# Patient Record
Sex: MALE | Race: WHITE | Employment: FULL TIME | URBAN - NONMETROPOLITAN AREA
[De-identification: names, ages, dates, MRNs, and addresses within clinical notes are randomized per-mention and may not be internally consistent; named-entity substitution may affect disease eponyms.]

---

## 2020-05-27 ENCOUNTER — HOSPITAL ENCOUNTER (EMERGENCY)
Age: 20
Discharge: HOME | End: 2020-05-27

## 2020-05-27 ENCOUNTER — APPOINTMENT (OUTPATIENT)
Dept: GENERAL RADIOLOGY | Age: 20
End: 2020-05-27

## 2020-05-27 ENCOUNTER — APPOINTMENT (OUTPATIENT)
Dept: CT IMAGING | Age: 20
End: 2020-05-27

## 2020-05-27 VITALS
BODY MASS INDEX: 29.22 KG/M2 | DIASTOLIC BLOOD PRESSURE: 94 MMHG | OXYGEN SATURATION: 97 % | RESPIRATION RATE: 16 BRPM | SYSTOLIC BLOOD PRESSURE: 156 MMHG | HEART RATE: 102 BPM | WEIGHT: 240 LBS | HEIGHT: 76 IN | TEMPERATURE: 99.1 F

## 2020-05-27 PROCEDURE — 72100 X-RAY EXAM L-S SPINE 2/3 VWS: CPT

## 2020-05-27 PROCEDURE — 72072 X-RAY EXAM THORAC SPINE 3VWS: CPT

## 2020-05-27 PROCEDURE — 70450 CT HEAD/BRAIN W/O DYE: CPT

## 2020-05-27 PROCEDURE — 6370000000 HC RX 637 (ALT 250 FOR IP): Performed by: NURSE PRACTITIONER

## 2020-05-27 PROCEDURE — 72125 CT NECK SPINE W/O DYE: CPT

## 2020-05-27 PROCEDURE — 99283 EMERGENCY DEPT VISIT LOW MDM: CPT

## 2020-05-27 RX ORDER — IBUPROFEN 800 MG/1
800 TABLET ORAL ONCE
Status: COMPLETED | OUTPATIENT
Start: 2020-05-27 | End: 2020-05-27

## 2020-05-27 RX ORDER — IBUPROFEN 600 MG/1
600 TABLET ORAL EVERY 6 HOURS PRN
Qty: 30 TABLET | Refills: 0 | Status: SHIPPED | OUTPATIENT
Start: 2020-05-27 | End: 2021-10-18 | Stop reason: ALTCHOICE

## 2020-05-27 RX ORDER — CYCLOBENZAPRINE HCL 10 MG
10 TABLET ORAL 3 TIMES DAILY PRN
Qty: 12 TABLET | Refills: 0 | Status: SHIPPED | OUTPATIENT
Start: 2020-05-27 | End: 2020-06-06

## 2020-05-27 RX ADMIN — IBUPROFEN 800 MG: 800 TABLET, FILM COATED ORAL at 19:40

## 2020-05-27 ASSESSMENT — PAIN DESCRIPTION - PAIN TYPE: TYPE: ACUTE PAIN

## 2020-05-27 ASSESSMENT — PAIN SCALES - GENERAL
PAINLEVEL_OUTOF10: 4
PAINLEVEL_OUTOF10: 6

## 2020-05-27 ASSESSMENT — PAIN DESCRIPTION - LOCATION: LOCATION: HEAD

## 2020-05-27 ASSESSMENT — PAIN DESCRIPTION - FREQUENCY: FREQUENCY: CONTINUOUS

## 2020-05-27 ASSESSMENT — PAIN DESCRIPTION - ONSET: ONSET: SUDDEN

## 2020-05-27 ASSESSMENT — PAIN DESCRIPTION - DESCRIPTORS: DESCRIPTORS: ACHING

## 2020-05-28 ASSESSMENT — ENCOUNTER SYMPTOMS
CHEST TIGHTNESS: 0
BACK PAIN: 1
ABDOMINAL PAIN: 0
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
## 2020-09-07 ENCOUNTER — APPOINTMENT (OUTPATIENT)
Dept: CT IMAGING | Age: 20
DRG: 156 | End: 2020-09-07

## 2020-09-07 ENCOUNTER — HOSPITAL ENCOUNTER (INPATIENT)
Age: 20
LOS: 4 days | Discharge: HOME | DRG: 156 | End: 2020-09-14
Attending: INTERNAL MEDICINE | Admitting: INTERNAL MEDICINE

## 2020-09-07 PROBLEM — K11.3 ABSCESS OF PAROTID GLAND: Status: ACTIVE | Noted: 2020-09-07

## 2020-09-07 LAB
ALBUMIN SERPL-MCNC: 4.7 G/DL (ref 3.5–5.1)
ALP BLD-CCNC: 134 U/L (ref 38–126)
ALT SERPL-CCNC: 43 U/L (ref 11–66)
ANION GAP SERPL CALCULATED.3IONS-SCNC: 12 MEQ/L (ref 8–16)
AST SERPL-CCNC: 34 U/L (ref 5–40)
BASOPHILS # BLD: 0.2 %
BASOPHILS ABSOLUTE: 0 THOU/MM3 (ref 0–0.1)
BILIRUB SERPL-MCNC: 0.5 MG/DL (ref 0.3–1.2)
BUN BLDV-MCNC: 7 MG/DL (ref 7–22)
CALCIUM SERPL-MCNC: 9.5 MG/DL (ref 8.5–10.5)
CHLORIDE BLD-SCNC: 103 MEQ/L (ref 98–111)
CO2: 23 MEQ/L (ref 23–33)
CREAT SERPL-MCNC: 0.7 MG/DL (ref 0.4–1.2)
EOSINOPHIL # BLD: 1.1 %
EOSINOPHILS ABSOLUTE: 0.1 THOU/MM3 (ref 0–0.4)
ERYTHROCYTE [DISTWIDTH] IN BLOOD BY AUTOMATED COUNT: 12.6 % (ref 11.5–14.5)
ERYTHROCYTE [DISTWIDTH] IN BLOOD BY AUTOMATED COUNT: 40.9 FL (ref 35–45)
GFR SERPL CREATININE-BSD FRML MDRD: > 90 ML/MIN/1.73M2
GLUCOSE BLD-MCNC: 99 MG/DL (ref 70–108)
HCT VFR BLD CALC: 48.6 % (ref 42–52)
HEMOGLOBIN: 16.5 GM/DL (ref 14–18)
IMMATURE GRANS (ABS): 0.04 THOU/MM3 (ref 0–0.07)
IMMATURE GRANULOCYTES: 0.3 %
LACTIC ACID: 1.1 MMOL/L (ref 0.5–2.2)
LYMPHOCYTES # BLD: 12.2 %
LYMPHOCYTES ABSOLUTE: 1.5 THOU/MM3 (ref 1–4.8)
MCH RBC QN AUTO: 29.9 PG (ref 26–33)
MCHC RBC AUTO-ENTMCNC: 34 GM/DL (ref 32.2–35.5)
MCV RBC AUTO: 88.2 FL (ref 80–94)
MONOCYTES # BLD: 8.5 %
MONOCYTES ABSOLUTE: 1 THOU/MM3 (ref 0.4–1.3)
NUCLEATED RED BLOOD CELLS: 0 /100 WBC
OSMOLALITY CALCULATION: 273.7 MOSMOL/KG (ref 275–300)
PLATELET # BLD: 257 THOU/MM3 (ref 130–400)
PMV BLD AUTO: 9 FL (ref 9.4–12.4)
POTASSIUM REFLEX MAGNESIUM: 4.2 MEQ/L (ref 3.5–5.2)
PROCALCITONIN: 0.04 NG/ML (ref 0.01–0.09)
RBC # BLD: 5.51 MILL/MM3 (ref 4.7–6.1)
SEG NEUTROPHILS: 77.7 %
SEGMENTED NEUTROPHILS ABSOLUTE COUNT: 9.2 THOU/MM3 (ref 1.8–7.7)
SODIUM BLD-SCNC: 138 MEQ/L (ref 135–145)
TOTAL PROTEIN: 7.8 G/DL (ref 6.1–8)
WBC # BLD: 11.9 THOU/MM3 (ref 4.8–10.8)

## 2020-09-07 PROCEDURE — 84145 PROCALCITONIN (PCT): CPT

## 2020-09-07 PROCEDURE — 80053 COMPREHEN METABOLIC PANEL: CPT

## 2020-09-07 PROCEDURE — 87040 BLOOD CULTURE FOR BACTERIA: CPT

## 2020-09-07 PROCEDURE — 6360000002 HC RX W HCPCS: Performed by: PHYSICIAN ASSISTANT

## 2020-09-07 PROCEDURE — 99282 EMERGENCY DEPT VISIT SF MDM: CPT

## 2020-09-07 PROCEDURE — 6360000004 HC RX CONTRAST MEDICATION: Performed by: PHYSICIAN ASSISTANT

## 2020-09-07 PROCEDURE — 96375 TX/PRO/DX INJ NEW DRUG ADDON: CPT

## 2020-09-07 PROCEDURE — 99220 PR INITIAL OBSERVATION CARE/DAY 70 MINUTES: CPT | Performed by: PHYSICIAN ASSISTANT

## 2020-09-07 PROCEDURE — 2580000003 HC RX 258: Performed by: PHYSICIAN ASSISTANT

## 2020-09-07 PROCEDURE — 83605 ASSAY OF LACTIC ACID: CPT

## 2020-09-07 PROCEDURE — 36415 COLL VENOUS BLD VENIPUNCTURE: CPT

## 2020-09-07 PROCEDURE — 94761 N-INVAS EAR/PLS OXIMETRY MLT: CPT

## 2020-09-07 PROCEDURE — 96367 TX/PROPH/DG ADDL SEQ IV INF: CPT

## 2020-09-07 PROCEDURE — 70491 CT SOFT TISSUE NECK W/DYE: CPT

## 2020-09-07 PROCEDURE — G0378 HOSPITAL OBSERVATION PER HR: HCPCS

## 2020-09-07 PROCEDURE — 99285 EMERGENCY DEPT VISIT HI MDM: CPT

## 2020-09-07 PROCEDURE — 85025 COMPLETE CBC W/AUTO DIFF WBC: CPT

## 2020-09-07 PROCEDURE — 6370000000 HC RX 637 (ALT 250 FOR IP): Performed by: PHYSICIAN ASSISTANT

## 2020-09-07 PROCEDURE — 99281 EMR DPT VST MAYX REQ PHY/QHP: CPT

## 2020-09-07 PROCEDURE — 2500000003 HC RX 250 WO HCPCS: Performed by: PHYSICIAN ASSISTANT

## 2020-09-07 PROCEDURE — 96365 THER/PROPH/DIAG IV INF INIT: CPT

## 2020-09-07 RX ORDER — SODIUM CHLORIDE 0.9 % (FLUSH) 0.9 %
10 SYRINGE (ML) INJECTION EVERY 12 HOURS SCHEDULED
Status: DISCONTINUED | OUTPATIENT
Start: 2020-09-07 | End: 2020-09-14 | Stop reason: HOSPADM

## 2020-09-07 RX ORDER — KETOROLAC TROMETHAMINE 30 MG/ML
30 INJECTION, SOLUTION INTRAMUSCULAR; INTRAVENOUS ONCE
Status: COMPLETED | OUTPATIENT
Start: 2020-09-07 | End: 2020-09-07

## 2020-09-07 RX ORDER — MORPHINE SULFATE 4 MG/ML
4 INJECTION, SOLUTION INTRAMUSCULAR; INTRAVENOUS ONCE
Status: COMPLETED | OUTPATIENT
Start: 2020-09-07 | End: 2020-09-07

## 2020-09-07 RX ORDER — SODIUM CHLORIDE 0.9 % (FLUSH) 0.9 %
10 SYRINGE (ML) INJECTION PRN
Status: DISCONTINUED | OUTPATIENT
Start: 2020-09-07 | End: 2020-09-14 | Stop reason: HOSPADM

## 2020-09-07 RX ORDER — ONDANSETRON 2 MG/ML
4 INJECTION INTRAMUSCULAR; INTRAVENOUS ONCE
Status: COMPLETED | OUTPATIENT
Start: 2020-09-07 | End: 2020-09-07

## 2020-09-07 RX ORDER — 0.9 % SODIUM CHLORIDE 0.9 %
500 INTRAVENOUS SOLUTION INTRAVENOUS ONCE
Status: COMPLETED | OUTPATIENT
Start: 2020-09-07 | End: 2020-09-07

## 2020-09-07 RX ORDER — ACETAMINOPHEN 325 MG/1
650 TABLET ORAL EVERY 6 HOURS PRN
Status: DISCONTINUED | OUTPATIENT
Start: 2020-09-07 | End: 2020-09-14 | Stop reason: HOSPADM

## 2020-09-07 RX ORDER — POLYETHYLENE GLYCOL 3350 17 G/17G
17 POWDER, FOR SOLUTION ORAL DAILY PRN
Status: DISCONTINUED | OUTPATIENT
Start: 2020-09-07 | End: 2020-09-14 | Stop reason: HOSPADM

## 2020-09-07 RX ORDER — CLINDAMYCIN PHOSPHATE 900 MG/50ML
900 INJECTION INTRAVENOUS ONCE
Status: COMPLETED | OUTPATIENT
Start: 2020-09-07 | End: 2020-09-07

## 2020-09-07 RX ORDER — ACETAMINOPHEN 500 MG
500 TABLET ORAL EVERY 6 HOURS PRN
COMMUNITY
End: 2021-10-18 | Stop reason: ALTCHOICE

## 2020-09-07 RX ORDER — HYDROCODONE BITARTRATE AND ACETAMINOPHEN 5; 325 MG/1; MG/1
1 TABLET ORAL EVERY 4 HOURS PRN
Status: DISCONTINUED | OUTPATIENT
Start: 2020-09-07 | End: 2020-09-14 | Stop reason: HOSPADM

## 2020-09-07 RX ORDER — PROMETHAZINE HYDROCHLORIDE 25 MG/1
12.5 TABLET ORAL EVERY 6 HOURS PRN
Status: DISCONTINUED | OUTPATIENT
Start: 2020-09-07 | End: 2020-09-14 | Stop reason: HOSPADM

## 2020-09-07 RX ORDER — ACETAMINOPHEN 650 MG/1
650 SUPPOSITORY RECTAL EVERY 6 HOURS PRN
Status: DISCONTINUED | OUTPATIENT
Start: 2020-09-07 | End: 2020-09-14 | Stop reason: HOSPADM

## 2020-09-07 RX ORDER — ONDANSETRON 2 MG/ML
4 INJECTION INTRAMUSCULAR; INTRAVENOUS EVERY 6 HOURS PRN
Status: DISCONTINUED | OUTPATIENT
Start: 2020-09-07 | End: 2020-09-14 | Stop reason: HOSPADM

## 2020-09-07 RX ORDER — SODIUM CHLORIDE 9 MG/ML
INJECTION, SOLUTION INTRAVENOUS CONTINUOUS
Status: DISCONTINUED | OUTPATIENT
Start: 2020-09-07 | End: 2020-09-14 | Stop reason: HOSPADM

## 2020-09-07 RX ORDER — METHYLPREDNISOLONE SODIUM SUCCINATE 125 MG/2ML
125 INJECTION, POWDER, LYOPHILIZED, FOR SOLUTION INTRAMUSCULAR; INTRAVENOUS ONCE
Status: COMPLETED | OUTPATIENT
Start: 2020-09-07 | End: 2020-09-07

## 2020-09-07 RX ORDER — HYDROCODONE BITARTRATE AND ACETAMINOPHEN 5; 325 MG/1; MG/1
2 TABLET ORAL EVERY 4 HOURS PRN
Status: DISCONTINUED | OUTPATIENT
Start: 2020-09-07 | End: 2020-09-14 | Stop reason: HOSPADM

## 2020-09-07 RX ADMIN — KETOROLAC TROMETHAMINE 30 MG: 30 INJECTION, SOLUTION INTRAMUSCULAR at 20:12

## 2020-09-07 RX ADMIN — CLINDAMYCIN IN 5 PERCENT DEXTROSE 900 MG: 18 INJECTION, SOLUTION INTRAVENOUS at 20:24

## 2020-09-07 RX ADMIN — SODIUM CHLORIDE: 9 INJECTION, SOLUTION INTRAVENOUS at 23:06

## 2020-09-07 RX ADMIN — SODIUM CHLORIDE 500 ML: 9 INJECTION, SOLUTION INTRAVENOUS at 20:12

## 2020-09-07 RX ADMIN — HYDROCODONE BITARTRATE AND ACETAMINOPHEN 2 TABLET: 5; 325 TABLET ORAL at 23:00

## 2020-09-07 RX ADMIN — METHYLPREDNISOLONE SODIUM SUCCINATE 125 MG: 125 INJECTION, POWDER, FOR SOLUTION INTRAMUSCULAR; INTRAVENOUS at 21:40

## 2020-09-07 RX ADMIN — IOPAMIDOL 80 ML: 755 INJECTION, SOLUTION INTRAVENOUS at 20:44

## 2020-09-07 RX ADMIN — MORPHINE SULFATE 4 MG: 4 INJECTION, SOLUTION INTRAMUSCULAR; INTRAVENOUS at 21:40

## 2020-09-07 RX ADMIN — AMPICILLIN SODIUM AND SULBACTAM SODIUM 1.5 G: 1; .5 INJECTION, POWDER, FOR SOLUTION INTRAMUSCULAR; INTRAVENOUS at 22:02

## 2020-09-07 RX ADMIN — SODIUM CHLORIDE: 9 INJECTION, SOLUTION INTRAVENOUS at 22:24

## 2020-09-07 RX ADMIN — ONDANSETRON 4 MG: 2 INJECTION INTRAMUSCULAR; INTRAVENOUS at 21:40

## 2020-09-07 ASSESSMENT — ENCOUNTER SYMPTOMS
FACIAL SWELLING: 1
TROUBLE SWALLOWING: 0
VOMITING: 0
SORE THROAT: 0
COLOR CHANGE: 0
SHORTNESS OF BREATH: 0

## 2020-09-07 ASSESSMENT — PAIN SCALES - GENERAL
PAINLEVEL_OUTOF10: 7
PAINLEVEL_OUTOF10: 9
PAINLEVEL_OUTOF10: 9
PAINLEVEL_OUTOF10: 7

## 2020-09-07 ASSESSMENT — PAIN DESCRIPTION - FREQUENCY
FREQUENCY: CONTINUOUS
FREQUENCY: CONTINUOUS

## 2020-09-07 ASSESSMENT — PAIN DESCRIPTION - LOCATION
LOCATION: TEETH
LOCATION: TEETH

## 2020-09-07 ASSESSMENT — PAIN DESCRIPTION - ORIENTATION
ORIENTATION: RIGHT;LEFT
ORIENTATION: RIGHT;LEFT

## 2020-09-07 ASSESSMENT — PAIN DESCRIPTION - DESCRIPTORS
DESCRIPTORS: ACHING
DESCRIPTORS: ACHING

## 2020-09-07 ASSESSMENT — PAIN DESCRIPTION - PAIN TYPE
TYPE: ACUTE PAIN
TYPE: ACUTE PAIN

## 2020-09-07 NOTE — ED TRIAGE NOTES
Pt comes to the ED with dental pain for the past week. Pt states he needs his wisdom teeth taken out but he cannot get in until December.   Pt states he saw a dentist last Thursday and they gave him a \"rinse\"

## 2020-09-08 LAB
ANION GAP SERPL CALCULATED.3IONS-SCNC: 11 MEQ/L (ref 8–16)
BUN BLDV-MCNC: 7 MG/DL (ref 7–22)
CALCIUM SERPL-MCNC: 9.9 MG/DL (ref 8.5–10.5)
CHLORIDE BLD-SCNC: 102 MEQ/L (ref 98–111)
CO2: 23 MEQ/L (ref 23–33)
CREAT SERPL-MCNC: 0.5 MG/DL (ref 0.4–1.2)
ERYTHROCYTE [DISTWIDTH] IN BLOOD BY AUTOMATED COUNT: 12.7 % (ref 11.5–14.5)
ERYTHROCYTE [DISTWIDTH] IN BLOOD BY AUTOMATED COUNT: 40.8 FL (ref 35–45)
GFR SERPL CREATININE-BSD FRML MDRD: > 90 ML/MIN/1.73M2
GLUCOSE BLD-MCNC: 133 MG/DL (ref 70–108)
HCT VFR BLD CALC: 50.6 % (ref 42–52)
HEMOGLOBIN: 17.1 GM/DL (ref 14–18)
MCH RBC QN AUTO: 29.9 PG (ref 26–33)
MCHC RBC AUTO-ENTMCNC: 33.8 GM/DL (ref 32.2–35.5)
MCV RBC AUTO: 88.5 FL (ref 80–94)
OSMOLALITY CALCULATION: 271.8 MOSMOL/KG (ref 275–300)
PLATELET # BLD: 267 THOU/MM3 (ref 130–400)
PMV BLD AUTO: 9.2 FL (ref 9.4–12.4)
POTASSIUM REFLEX MAGNESIUM: 4.7 MEQ/L (ref 3.5–5.2)
RBC # BLD: 5.72 MILL/MM3 (ref 4.7–6.1)
SODIUM BLD-SCNC: 136 MEQ/L (ref 135–145)
WBC # BLD: 8.9 THOU/MM3 (ref 4.8–10.8)

## 2020-09-08 PROCEDURE — 6360000002 HC RX W HCPCS: Performed by: PHYSICIAN ASSISTANT

## 2020-09-08 PROCEDURE — G0378 HOSPITAL OBSERVATION PER HR: HCPCS

## 2020-09-08 PROCEDURE — 36415 COLL VENOUS BLD VENIPUNCTURE: CPT

## 2020-09-08 PROCEDURE — 99224 PR SBSQ OBSERVATION CARE/DAY 15 MINUTES: CPT | Performed by: NURSE PRACTITIONER

## 2020-09-08 PROCEDURE — 96366 THER/PROPH/DIAG IV INF ADDON: CPT

## 2020-09-08 PROCEDURE — 99225 PR SBSQ OBSERVATION CARE/DAY 25 MINUTES: CPT | Performed by: PHYSICIAN ASSISTANT

## 2020-09-08 PROCEDURE — 96367 TX/PROPH/DG ADDL SEQ IV INF: CPT

## 2020-09-08 PROCEDURE — 6360000002 HC RX W HCPCS: Performed by: NURSE PRACTITIONER

## 2020-09-08 PROCEDURE — 6370000000 HC RX 637 (ALT 250 FOR IP): Performed by: PHYSICIAN ASSISTANT

## 2020-09-08 PROCEDURE — 2580000003 HC RX 258: Performed by: PHYSICIAN ASSISTANT

## 2020-09-08 PROCEDURE — 94760 N-INVAS EAR/PLS OXIMETRY 1: CPT

## 2020-09-08 PROCEDURE — 2580000003 HC RX 258: Performed by: NURSE PRACTITIONER

## 2020-09-08 PROCEDURE — 80048 BASIC METABOLIC PNL TOTAL CA: CPT

## 2020-09-08 PROCEDURE — 85027 COMPLETE CBC AUTOMATED: CPT

## 2020-09-08 RX ORDER — LEVOFLOXACIN 5 MG/ML
500 INJECTION, SOLUTION INTRAVENOUS EVERY 24 HOURS
Status: DISCONTINUED | OUTPATIENT
Start: 2020-09-08 | End: 2020-09-08 | Stop reason: ALTCHOICE

## 2020-09-08 RX ADMIN — HYDROCODONE BITARTRATE AND ACETAMINOPHEN 2 TABLET: 5; 325 TABLET ORAL at 09:12

## 2020-09-08 RX ADMIN — PIPERACILLIN AND TAZOBACTAM 3.38 G: 3; .375 INJECTION, POWDER, LYOPHILIZED, FOR SOLUTION INTRAVENOUS at 22:38

## 2020-09-08 RX ADMIN — SODIUM CHLORIDE: 9 INJECTION, SOLUTION INTRAVENOUS at 10:00

## 2020-09-08 RX ADMIN — AMPICILLIN SODIUM AND SULBACTAM SODIUM 1.5 G: 1; .5 INJECTION, POWDER, FOR SOLUTION INTRAMUSCULAR; INTRAVENOUS at 05:11

## 2020-09-08 RX ADMIN — ACETAMINOPHEN 650 MG: 325 TABLET ORAL at 16:50

## 2020-09-08 RX ADMIN — SODIUM CHLORIDE 3 G: 900 INJECTION INTRAVENOUS at 16:53

## 2020-09-08 RX ADMIN — SODIUM CHLORIDE: 9 INJECTION, SOLUTION INTRAVENOUS at 21:11

## 2020-09-08 RX ADMIN — HYDROCODONE BITARTRATE AND ACETAMINOPHEN 2 TABLET: 5; 325 TABLET ORAL at 18:33

## 2020-09-08 RX ADMIN — HYDROCODONE BITARTRATE AND ACETAMINOPHEN 2 TABLET: 5; 325 TABLET ORAL at 22:38

## 2020-09-08 RX ADMIN — AMPICILLIN SODIUM AND SULBACTAM SODIUM 1.5 G: 1; .5 INJECTION, POWDER, FOR SOLUTION INTRAMUSCULAR; INTRAVENOUS at 09:54

## 2020-09-08 RX ADMIN — HYDROCODONE BITARTRATE AND ACETAMINOPHEN 1 TABLET: 5; 325 TABLET ORAL at 14:10

## 2020-09-08 ASSESSMENT — PAIN SCALES - GENERAL
PAINLEVEL_OUTOF10: 5
PAINLEVEL_OUTOF10: 4
PAINLEVEL_OUTOF10: 5
PAINLEVEL_OUTOF10: 4
PAINLEVEL_OUTOF10: 8
PAINLEVEL_OUTOF10: 8
PAINLEVEL_OUTOF10: 5
PAINLEVEL_OUTOF10: 6
PAINLEVEL_OUTOF10: 3
PAINLEVEL_OUTOF10: 7
PAINLEVEL_OUTOF10: 5
PAINLEVEL_OUTOF10: 6

## 2020-09-08 ASSESSMENT — PAIN DESCRIPTION - FREQUENCY: FREQUENCY: CONTINUOUS

## 2020-09-08 ASSESSMENT — PAIN DESCRIPTION - PAIN TYPE: TYPE: ACUTE PAIN

## 2020-09-08 ASSESSMENT — PAIN DESCRIPTION - PROGRESSION
CLINICAL_PROGRESSION: NOT CHANGED

## 2020-09-08 ASSESSMENT — PAIN DESCRIPTION - ONSET: ONSET: ON-GOING

## 2020-09-08 ASSESSMENT — PAIN - FUNCTIONAL ASSESSMENT: PAIN_FUNCTIONAL_ASSESSMENT: ACTIVITIES ARE NOT PREVENTED

## 2020-09-08 ASSESSMENT — PAIN DESCRIPTION - ORIENTATION: ORIENTATION: RIGHT

## 2020-09-08 ASSESSMENT — PAIN DESCRIPTION - DESCRIPTORS: DESCRIPTORS: ACHING;DISCOMFORT

## 2020-09-08 NOTE — CONSULTS
Department of Otolaryngology  Consult Note    Reason for Consult:  Right parotitis  Requesting Physician:  Perla Cooper PA-C    CHIEF COMPLAINT:  Right jaw swelling and pain    History Obtained From:  patient, electronic medical record    HISTORY OF PRESENT ILLNESS:                The patient is a 23 y.o. male who was admitted to [facility] last evening for right jaw swelling and pain. Patient reports onset of symptoms about 8 days ago. Initially he ignored the symptoms, then was concerned about a tooth problem a few days later - seen with his dentist and told he needed wisdom teeth removed. He continued to have increased right jaw pain and swelling that became progressively worse. He was unable to eat or drink much of anything, noting that even Jello was difficult for him to manage. He is barely able to open his mouth, which has become progressively worse as well. He denies any fevers. He has never had an issue like this before. No history of kidney stones. No excess calcium intake. He is not using sialogogues, aside from some pickle juice earlier this am.  Using a warm washcloth intermittently. WBC on admission 11.9. Afebrile. Given IV Clindamycin 900mg x1 dose and solumedrol 125mg x1 dose in ER. Receiving Unasyn 1.5gm Q6h. Past Medical History:    History reviewed. No pertinent past medical history. Past Surgical History:    History reviewed. No pertinent surgical history.     Current Medications:   Current Facility-Administered Medications: piperacillin-tazobactam (ZOSYN) 3.375 g in dextrose 5 % 50 mL IVPB extended infusion (mini-bag), 3.375 g, Intravenous, Q8H  sodium chloride flush 0.9 % injection 10 mL, 10 mL, Intravenous, 2 times per day  sodium chloride flush 0.9 % injection 10 mL, 10 mL, Intravenous, PRN  acetaminophen (TYLENOL) tablet 650 mg, 650 mg, Oral, Q6H PRN **OR** acetaminophen (TYLENOL) suppository 650 mg, 650 mg, Rectal, Q6H PRN  polyethylene glycol (GLYCOLAX) packet 17 g, 17 g, Oral, Daily PRN  promethazine (PHENERGAN) tablet 12.5 mg, 12.5 mg, Oral, Q6H PRN **OR** ondansetron (ZOFRAN) injection 4 mg, 4 mg, Intravenous, Q6H PRN  enoxaparin (LOVENOX) injection 40 mg, 40 mg, Subcutaneous, Daily  0.9 % sodium chloride infusion, , Intravenous, Continuous  HYDROcodone-acetaminophen (NORCO) 5-325 MG per tablet 1 tablet, 1 tablet, Oral, Q4H PRN **OR** HYDROcodone-acetaminophen (NORCO) 5-325 MG per tablet 2 tablet, 2 tablet, Oral, Q4H PRN    Allergies:  Review of patient's allergies indicates no known allergies. Social History:    TOBACCO:   reports that he has never smoked. He has never used smokeless tobacco.  ETOH:   reports current alcohol use of about 5.0 - 15.0 standard drinks of alcohol per week. DRUGS:   reports no history of drug use. Family History:   History reviewed. No pertinent family history. REVIEW OF SYSTEMS:    A complete multi-organ review of systems was performed using a new patient questionnaire, and reviewed by me.   ENT:  negative except as noted in HPI  CONSTITUTIONAL:  negative except as noted in HPI  EYES:  negative except as noted in HPI  RESPIRATORY:  negative except as noted in HPI  CARDIOVASCULAR:  negative except as noted in HPI  GASTROINTESTINAL:  negative except as noted in HPI  GENITOURINARY:  negative except as noted in HPI  MUSCULOSKELETAL:  negative except as noted in HPI  SKIN:  negative except as noted in HPI  ENDOCRINE/METABOLIC: negative except as noted in HPI  HEMATOLOGIC/LYMPHATIC:  negative except as noted in HPI  ALLERGY/IMMUN: negative except as noted in HPI  NEUROLOGICAL:  negative except as noted in HPI  BEHAVIOR/PSYCH:  negative except as noted in HPI    PHYSICAL EXAM:    VITALS:  BP (!) 144/84   Pulse 84   Temp 97.5 °F (36.4 °C) (Oral)   Resp 16   Ht 6' 5\" (1.956 m)   Wt 271 lb (122.9 kg)   SpO2 100%   BMI 32.14 kg/m²   CONSTITUTIONAL:  awake, alert, cooperative, no apparent distress, and appears stated age  ENT:  Normocephalic, without obvious abnormality, atraumatic, external ears without lesions, oral pharynx with moist mucus membranes, tonsils unremarkable, gums normal and good dentition. NECK:  Left supple without palpable adenopathy or other palpable abnormalities. Right parotid gland is very firm, immobile, very tender. Oral mucous membranes moist.  Unable to express any material from right parotid duct- mild swelling at duct opening. + moderate trismus. LUNGS:  no increased work of breathing  CARDIOVASCULAR:  regular rate and rhythm  NEUROLOGIC:  Awake, alert, oriented to name, place and time. Cranial nerves II-XII are grossly intact. DATA:    CBC with Differential:    Lab Results   Component Value Date    WBC 8.9 09/08/2020    RBC 5.72 09/08/2020    HGB 17.1 09/08/2020    HCT 50.6 09/08/2020     09/08/2020    MCV 88.5 09/08/2020    MCH 29.9 09/08/2020    MCHC 33.8 09/08/2020    NRBC 0 09/07/2020    SEGSPCT 77.7 09/07/2020    MONOPCT 8.5 09/07/2020    MONOSABS 1.0 09/07/2020    LYMPHSABS 1.5 09/07/2020    EOSABS 0.1 09/07/2020    BASOSABS 0.0 09/07/2020     Radiology Review:  9/7/2020 2052 CT Neck W Contrast  Impression    1. The right parotid gland is enlarged measuring 5.1 x 2.0 x 4.3 cm. There is mixed attenuation throughout the enlarged right parotid gland suggesting a combination of abscess formation and phlegmon. There are stranding densities consistent with    inflammation within the adjacent subcutaneous tissues. There are numerous cervical lymph nodes which most likely are reactive. There is no associated osseous or vascular abnormality. Appropriate clinical management is recommended.         **This report has been created using voice recognition software.  It may contain minor errors which are inherent in voice recognition technology. **         Final report electronically signed by Dr. Yadira Ortega on 9/7/2020 9:06 PM        IMPRESSION/RECOMMENDATIONS:      Acute parotitis, right  - WBC improved, 8.9 today. Afebrile.  - CT reviewed with Dr Truman Rodriguez - no defined abscess, appears as inspissated saliva within the gland  - Increase Unasyn to 3gm dose. Dr Truman Rodriguez recommends adding Levaquin. - If no significant improvement in the am, consider repeat CT imaging. Imperative to resolution of parotitis (these are not optional/prn):            - Oral hydration and hygiene. - Apply moist heat for 15 minutes followed by gentle massage (over parotid gland and towards front of mouth, this is tomilk the gland. The duct from the gland empties in the mouth across from the top molars). Do this every 4 hours while awake. - Use sialogogues (increases saliva production)- lemon wedges, sour candies (warheads, lemon drops, etc) every 3-4 hours while awake.     Electronically signed by HERI Tran CNP on 9/8/2020 at 4:13 PM

## 2020-09-08 NOTE — H&P
Hospitalist - History & Physical      Patient: Laz Gonzales    Unit/Bed:21/021A  YOB: 2000  MRN: [id number]   Acct: [de-identified]   PCP: No primary care provider on file. Date of Service: Pt seen/examined on 09/07/20  and Admitted to Observation with expected LOS less than two midnights due to medical therapy. Chief Complaint:  Right Dental Pain    Assessment and Plan:-  1. Right Parotid Gland Abscess with phlegmon: 5.1x2.0x4.3cm right parotid gland abscess and phlegmon, cervical lymphadenopathy. WBC 11.9. Unasyn, Solu-medrol x1, morphine for analgesia in ED. BC x2 pending. a. ENT consulted in ED who recommended monitoring overnight and if feeling better in AM to f/u with [facility] outpatient on 9/11/20.   b. Continue Unasyn, transition to PO abx on discharge. IVF due to poor PO intake x3 days with difficulty chewing. Warm compresses. Sialogogues not available in main pharmacy, recommend contacting dietary for sour food/drink in AM. Discussed sialogogues with patient. History Of Present Illness:    Laz Gonzales is a 24 y/o  male without significant past medical history who presented to [facility] on 9/7/20 c/o right jaw pain x1 week. He states that he visited his dentist five days ago and they recommended he have wisdom teeth removed in 12/2020. Pain persisted throughout the week and became unbearable with Ibuprofen/Tylenol regimen at home. He states that he has had decreased PO intake due to the pain associated with chewing. He denies any ear pain, sore throat, vision changes, sour taste in mouth, fever, or chills. No increased salivation. [place] [employer] He denies any chest pain, SOB, or productive cough. Workup in ED revealed a right parotid gland abscess with phlegmon. He was started on Unasyn in ED and treated with Solumedrol and morphine. ENT was consulted by ED who recommended admission and monitoring overnight.  If he feels better in AM goal would be to discharge with plan for outpatient ENT.. Patient admitted under hospitalist service for further evaluation and treatment. Past Medical History:    No past medical history on file. Past Surgical History:    No past surgical history on file. Home Medications:   No current facility-administered medications on file prior to encounter. Current Outpatient Medications on File Prior to Encounter   Medication Sig Dispense Refill    ibuprofen (ADVIL;MOTRIN) 600 MG tablet Take 1 tablet by mouth every 6 hours as needed for Pain 30 tablet 0       Allergies:    Patient has no known allergies. Social History:    reports that he has never smoked. He has never used smokeless tobacco. He reports previous alcohol use. He reports that he does not use drugs. Family History:   No family history on file. Diet:  No diet orders on file    Review of systems:   Pertinent positives as noted in the HPI. All other systems reviewed and negative. PHYSICAL EXAM:  BP (!) 152/93   Pulse 88   Temp 98 °F (36.7 °C) (Oral)   Resp 16   Ht 6' 5\" (1.956 m)   Wt 240 lb (108.9 kg)   SpO2 99%   BMI 28.46 kg/m²   General appearance: No apparent distress, appears stated age and cooperative. HEENT: Normal cephalic, atraumatic. Swelling, erythema, and TTP over the right parotid gland. Opens mouth approximately 2cm wide, no further secondary to pain. TTP right submandibular region and right anterior cervical chain with associated lymphadenopathy. Pupils equal, round, and reactive to light. Extra ocular muscles intact. Conjunctivae/corneas clear. Neck: Supple, with full range of motion. No jugular venous distention. Trachea midline. Respiratory:  Normal respiratory effort. Clear to auscultation, bilaterally without Rales/Wheezes/Rhonchi. Cardiovascular: Regular rate and rhythm with normal S1/S2 without murmurs, rubs or gallops. Abdomen: Soft, non-tender, non-distended with normal bowel sounds.   Musculoskeletal:  No clubbing, cyanosis or edema bilaterally. Skin: Skin color, texture, turgor normal.  No rashes or lesions. Neurologic:  Neurovascularly intact without any focal sensory/motor deficits. Cranial nerves: II-XII intact, grossly non-focal.  Psychiatric: Alert and oriented, thought content appropriate, normal insight  Capillary Refill: Brisk,< 3 seconds   Peripheral Pulses: +2 palpable, equal bilaterally     Labs:   Recent Labs     09/07/20 2005   WBC 11.9*   HGB 16.5   HCT 48.6        Recent Labs     09/07/20 2005      K 4.2      CO2 23   BUN 7   CREATININE 0.7   CALCIUM 9.5     Recent Labs     09/07/20 2005   AST 34   ALT 43   BILITOT 0.5   ALKPHOS 134*     No results for input(s): INR in the last 72 hours. No results for input(s): Burnadette Lundborg in the last 72 hours. Urinalysis:    No results found for: Mariposa Hoops, BACTERIA, RBCUA, BLOODU, SPECGRAV, [address]    Radiology:   CT SOFT TISSUE NECK W CONTRAST   Final Result   1. The right parotid gland is enlarged measuring 5.1 x 2.0 x 4.3 cm. There is mixed attenuation throughout the enlarged right parotid gland suggesting a combination of abscess formation and phlegmon. There are stranding densities consistent with    inflammation within the adjacent subcutaneous tissues. There are numerous cervical lymph nodes which most likely are reactive. There is no associated osseous or vascular abnormality. Appropriate clinical management is recommended. **This report has been created using voice recognition software. It may contain minor errors which are inherent in voice recognition technology. **      Final report electronically signed by Dr. Davis Serrano on 9/7/2020 9:06 PM        Ct Soft Tissue Neck W Contrast    Result Date: 9/7/2020  PROCEDURE: CT SOFT TISSUE NECK W CONTRAST CLINICAL INFORMATION: Right mandibular pain and swelling COMPARISON: No prior study.  TECHNIQUE: 3 mm axial imaging through the neck with intravenous contrast from the base of the brain to superior aspect of the heart. All CT scans at this facility use dose modulation, iterative reconstruction, and/or weight based dosing when appropriate to reduce the radiation dose to as low as reasonably achievable. CONTRAST: 80 mL Isovue-370 intravenously. FINDINGS: PHARYNX/ORAL CAVITY/TONSILS: Unremarkable. EPIGLOTTIS: Unremarkable. VALLECULA/PIRIFORM SINUSES: Unremarkable. LARYNX: Unremarkable. CERVICAL LYMPHADENOPATHY: 1. There are no pathologically enlarged lymph nodes. SALIVARY GLANDS/THYROID GLAND: 1. The right parotid gland is enlarged measuring 5.1 x 2.0 x 4.3 cm. There is mixed attenuation throughout the enlarged right parotid gland suggesting a combination of abscess formation and phlegmon. There are stranding densities consistent with inflammation within the adjacent subcutaneous tissues. There are numerous cervical lymph nodes which most likely are reactive. There is no associated osseous or vascular abnormality. 2. The left parotid gland is normal. 3. The thyroid gland is normal. INFLAMMATION: 1. As previously described. VASCULATURE: Unremarkable. LUNG APICES: Unremarkable. OSSEOUS STRUCTURES: Unremarkable. PARANASAL SINUSES: Unremarkable. BRAIN: Unremarkable. OTHER: None. 1. The right parotid gland is enlarged measuring 5.1 x 2.0 x 4.3 cm. There is mixed attenuation throughout the enlarged right parotid gland suggesting a combination of abscess formation and phlegmon. There are stranding densities consistent with inflammation within the adjacent subcutaneous tissues. There are numerous cervical lymph nodes which most likely are reactive. There is no associated osseous or vascular abnormality. Appropriate clinical management is recommended. **This report has been created using voice recognition software. It may contain minor errors which are inherent in voice recognition technology. ** Final report electronically signed by Dr. Ara Crisostomo on 9/7/2020 9:06 PM      Electronically signed by Franchesca Gutiérrez Romeo Tyler PA-C on 9/7/2020 at 9:41 PM

## 2020-09-08 NOTE — CARE COORDINATION
9/8/20, 10:43 AM EDT  DISCHARGE PLANNING EVALUATION:    Meghann Torres       Admitted from: ER, patient presented with right jaw pain. [facility] day: 0   Location: -11/011-A Reason for admit: Abscess of parotid gland [K11.3] Status: Obs. Admit order signed?: no  PMH:  has no past medical history on file. Procedure: N/A  Pertinent abnormal Imaging:CT of head and cervical spine: both negative for acute findings. CT of neck soft tissue:   1. The right parotid gland is enlarged measuring 5.1 x 2.0 x 4.3 cm. There is mixed attenuation throughout the enlarged right parotid gland suggesting a combination of abscess formation and phlegmon. There are stranding densities consistent with    inflammation within the adjacent subcutaneous tissues. There are numerous cervical lymph nodes which most likely are reactive. There is no associated osseous or vascular abnormality. Appropriate clinical management is recommended. Medications:  Scheduled Meds:   sodium chloride flush  10 mL Intravenous 2 times per day    enoxaparin  40 mg Subcutaneous Daily    ampicillin-sulbactam  1.5 g Intravenous Q6H     Continuous Infusions:   sodium chloride 100 mL/hr at 09/08/20 1000      Pertinent Info/Orders/Treatment Plan:  IV fluids, Unasyn, IV Cleocin and Solumedrol x 1 dose, prn Tylenol, Norco, Glycolax, Phenergan and Zofran, up as tolerated. Diet: DIET GENERAL;   Smoking status:  reports that he has never smoked. He has never used smokeless tobacco.   PCP: No primary care provider on file.   Readmission 30 days or less: No   %    Discharge Planning Evaluation  Current Residence:  Other (Comment)(college dorm)  Living Arrangements:  Friends   Support Systems:  Friends/Neighbors, Parent  Current Services PTA:     Potential Assistance Needed:  N/A  Potential Assistance Purchasing Medications:  No  Does patient want to participate in local refill/ meds to beds program?  Not Assessed  Type of Home Care Services: None  Patient expects to be discharged to:     Expected Discharge date: Follow Up Appointment: Best Day/ Time: Friday AM    Patient Goals/Plan/Treatment Preferences: Met with Mal Calabrese. He is a student at [employer]. Mal Calabrese drove himself to the hospital and will drive himself home at discharge. He is in agreement for a hospital follow-up appointment at the [employer] [facility] [employer] location. He is scheduled to follow-up with Burke Carney on 9/15/2020 at 09:00 a.m. Mal Calabrese denies a need for services or DME at discharge. Transportation/Food Security/Housekeeping Addressed:  No issues identified.     Evaluation: no

## 2020-09-08 NOTE — ED PROVIDER NOTES
[facility]  eMERGENCY dEPARTMENT eNCOUnter          CHIEF COMPLAINT       Chief Complaint   Patient presents with    Dental Pain       Nurses Notes reviewed and I agree except as noted inthe HPI. HISTORY OF PRESENT ILLNESS    Allan Wright is a 23 y.o. male who presents to the Emergency Department for the evaluation of dental pain. Patient reports that on August 31, he began having pain and swelling to his right jaw. He saw his dentist who did x-ray confirming presence of wisdom teeth and reports he was started on an unknown oral rinse 4 days ago but symptoms have gotten progressively worse. He reports he had no difficulty moving his jaw at that time, but now he cannot open his jaw fully. He reports he's been on a liquid diet. Reports cold fluids improve his pain temporarily. No worsening with chewing. No fevers, chills, nausea, vomiting, difficulty swallowing, shortness of breath. No history of similar symptoms. He's been taking 600 mg Ibuprofen and alternating with 500 mg Tylenol, but this provides only minimal improvement. The HPI was provided by the patient. REVIEW OF SYSTEMS     Review of Systems   Constitutional: Negative for chills and fever. HENT: Positive for dental problem and facial swelling. Negative for sore throat and trouble swallowing. Respiratory: Negative for shortness of breath. Gastrointestinal: Negative for vomiting. Musculoskeletal: Negative for neck pain and neck stiffness. Skin: Negative for color change. Neurological: Negative for headaches. PAST MEDICAL HISTORY    has no past medical history on file. SURGICAL HISTORY      has no past surgical history on file. CURRENT MEDICATIONS       Previous Medications    IBUPROFEN (ADVIL;MOTRIN) 600 MG TABLET    Take 1 tablet by mouth every 6 hours as needed for Pain       ALLERGIES     has No Known Allergies. FAMILY HISTORY     has no family status information on file.     family history is not on file. SOCIAL HISTORY      reports that he has never smoked. He has never used smokeless tobacco. He reports previous alcohol use. He reports that he does not use drugs. PHYSICAL EXAM     INITIAL VITALS:  height is 6' 5\" (1.956 m) and weight is 240 lb (108.9 kg). His oral temperature is 98 °F (36.7 °C). His blood pressure is 142/88 (abnormal) and his pulse is 86. His respiration is 18 and oxygen saturation is 99%. Physical Exam  Vitals signs and nursing note reviewed. Constitutional:       Appearance: Normal appearance. HENT:      Head: Normocephalic. Jaw: Trismus present. Mouth/Throat:      Mouth: Mucous membranes are moist.      Dentition: Normal dentition. No dental tenderness, gingival swelling, dental caries or dental abscesses. Pharynx: Oropharynx is clear. Eyes:      Conjunctiva/sclera: Conjunctivae normal.   Cardiovascular:      Rate and Rhythm: Normal rate. Pulmonary:      Effort: Pulmonary effort is normal. No respiratory distress. Breath sounds: No stridor. Skin:     General: Skin is warm and dry. Neurological:      General: No focal deficit present. Mental Status: He is alert and oriented to person, place, and time. Psychiatric:         Mood and Affect: Mood normal.         Behavior: Behavior normal.         DIFFERENTIAL DIAGNOSIS:   Differential diagnoses are discussed    DIAGNOSTIC RESULTS     EKG: All EKG's are interpreted by the Emergency Department Physician who either signs or Co-signsthis chart in the absence of a cardiologist.          RADIOLOGY: non-plain film images(s) such as CT, Ultrasound and MRI are read by the radiologist.    CT SOFT TISSUE NECK W CONTRAST   Final Result   1. The right parotid gland is enlarged measuring 5.1 x 2.0 x 4.3 cm. There is mixed attenuation throughout the enlarged right parotid gland suggesting a combination of abscess formation and phlegmon.  There are stranding densities consistent with    inflammation within the adjacent subcutaneous tissues. There are numerous cervical lymph nodes which most likely are reactive. There is no associated osseous or vascular abnormality. Appropriate clinical management is recommended. **This report has been created using voice recognition software. It may contain minor errors which are inherent in voice recognition technology. **      Final report electronically signed by Dr. Francisco Cárdenas on 9/7/2020 9:06 PM          LABS:      Labs Reviewed   CBC WITH AUTO DIFFERENTIAL - Abnormal; Notable for the following components:       Result Value    WBC 11.9 (*)     MPV 9.0 (*)     Segs Absolute 9.2 (*)     All other components within normal limits   COMPREHENSIVE METABOLIC PANEL W/ REFLEX TO MG FOR LOW K - Abnormal; Notable for the following components:    Alkaline Phosphatase 134 (*)     All other components within normal limits   OSMOLALITY - Abnormal; Notable for the following components:    Osmolality Calc 273.7 (*)     All other components within normal limits   CULTURE, BLOOD 1   CULTURE, BLOOD 2   LACTIC ACID, PLASMA   PROCALCITONIN   ANION GAP   GLOMERULAR FILTRATION RATE, ESTIMATED   BASIC METABOLIC PANEL W/ REFLEX TO MG FOR LOW K   CBC       EMERGENCY DEPARTMENT COURSE:   Vitals:    Vitals:    09/07/20 1904 09/07/20 2001 09/07/20 2146   BP: (!) 151/82 (!) 152/93 (!) 142/88   Pulse: 107 88 86   Resp: 12 16 18   Temp: 98 °F (36.7 °C)     TempSrc: Oral     SpO2: 98% 99% 99%   Weight: 240 lb (108.9 kg)     Height: 6' 5\" (1.956 m)        9:39 PM EDT: The patient was seen and evaluated. Patient presents for complaints of dental swelling. He states he has had pain and swelling since August 31, progressively worsening. He arrives here tachycardic, with white blood cell count 11,900. Lactic acid and pro calcitonin were within normal limits, blood cultures pending. He was initially started on clindamycin. CT obtained.   CT is consistent with parotid gland enlargement with associated abscess/phlegmon. Patient had no improvement in pain after Toradol. Discussed with attending provider. We will initiate Solu-Medrol, Unasyn, as well as morphine and Zofran for pain. Discussed with Dr. Elsy Britt, ENT, who is agreeable with the above plan as well as recommending lemon drops/juice and warm compresses. He did review the images. He is agreeable with plan of admission to the hospitalist service and will consult if the patient does not have significant improvement overnight and advises that the patient can follow with his [facility] on Friday if he has improved enough overnight for a.m. discharge. Discussed with the hospitalist service will admit for further care. CRITICAL CARE:   None    CONSULTS:  ENT  Hospitalist    PROCEDURES:  None    FINAL IMPRESSION      1. Abscess of parotid gland          DISPOSITION/PLAN   Admit    PATIENT REFERRED TO:  No follow-up provider specified.     DISCHARGEMEDICATIONS:  New Prescriptions    No medications on file       (Please note that portions of this note were completedwith a voice recognition program.  Efforts were made to edit the dictations but occasionally words are mis-transcribed.)       Madalyn Serrano PA-C  09/07/20 [id number]

## 2020-09-08 NOTE — PROGRESS NOTES
Hospitalist Progress Note      Patient:  Christelle Roberts    Unit/Bed:5K-11/011-A  YOB: 2000  MRN: [id number]   Acct: [de-identified]   PCP: No primary care provider on file. Date of Admission: 9/7/2020    Assessment/Plan:    1. Right Parotid Gland Abscess with phlegmon: 5.1x2.0x4.3cm right parotid gland abscess and phlegmon, cervical lymphadenopathy. On admission, WBC 11.9. Unasyn initiated 9/8/20, Solu-medrol x1, morphine for analgesia in ED. BC x2 pending. a. ENT consulted for assistance. b. WBC now wnl, and afebrile, however, minimal improvement in pain/swelling/PO tolerance. Discussed with ENT, they are recommending increasing Unasyn to 3g q6h, starting Levaquin and continuing sialogogues, heat, and massage. If no improvement, they are recommending consideration of repeat CT to monitor abscess. Chief Complaint: Right Dental Pain    Initial H and P:-    Christelle Roberts is a 22 y/o  male without significant past medical history who presented to [facility] on 9/7/20 c/o right jaw pain x1 week. He states that he visited his dentist five days ago and they recommended he have wisdom teeth removed in 12/2020. Pain persisted throughout the week and became unbearable with Ibuprofen/Tylenol regimen at home. He states that he has had decreased PO intake due to the pain associated with chewing. He denies any ear pain, sore throat, vision changes, sour taste in mouth, fever, or chills. No increased salivation. Collette Ruts He denies any chest pain, SOB, or productive cough. Workup in ED revealed a right parotid gland abscess with phlegmon. He was started on Unasyn in ED and treated with Solumedrol and morphine. ENT was consulted by ED who recommended admission and monitoring overnight. If he feels better in AM goal would be to discharge with plan for outpatient ENT. . Patient admitted under hospitalist service for further evaluation and treatment.      Subjective (past 24 hours):   Patient evaluated resting in his bed, attempting to eat soup. He states that the Norco helps for brief periods of time with the right jaw pain. Reports he is unable to open his mouth any further than he was yesterday. Denies any fever or chills. Denies any ear pain, vision changes, blurry vision, rhinorrhea, or nasal congestion. Denies any sour taste in his mouth. Reports good dental hygiene. Discussed case with patient regarding ENT recommendations. He understands is agreeable with current plan of care. No further questions at this time. Past medical history, family history, social history and allergies reviewed again and is unchanged since admission. ROS (12 point review of systems completed. Pertinent positives noted. Otherwise ROS is negative)     Medications:  Reviewed    Infusion Medications    sodium chloride 100 mL/hr at 09/08/20 1000     Scheduled Medications    ampicillin-sulbactam  3 g Intravenous Q6H    sodium chloride flush  10 mL Intravenous 2 times per day    enoxaparin  40 mg Subcutaneous Daily     PRN Meds: sodium chloride flush, acetaminophen **OR** acetaminophen, polyethylene glycol, promethazine **OR** ondansetron, HYDROcodone 5 mg - acetaminophen **OR** HYDROcodone 5 mg - acetaminophen      Intake/Output Summary (Last 24 hours) at 9/8/2020 1528  Last data filed at 9/8/2020 1442  Gross per 24 hour   Intake 2196.99 ml   Output --   Net 2196.99 ml       Diet:  DIET GENERAL;    Exam:  BP (!) 144/84   Pulse 84   Temp 97.5 °F (36.4 °C) (Oral)   Resp 16   Ht 6' 5\" (1.956 m)   Wt 271 lb (122.9 kg)   SpO2 100%   BMI 32.14 kg/m²   General appearance: No apparent distress, appears stated age and cooperative. HEENT: Normal cephalic, atraumatic. Swelling, erythema, and TTP over the right parotid gland. Opens mouth approximately 2cm wide, no further secondary to pain. No obvious purulence from parotid duct. TTP right submandibular region.  Anterior cervical chain lymphadenopathy, nontender. Pupils equal, round, and reactive to light. Extra ocular muscles intact. Conjunctivae/corneas clear. No tragal tenderness. Neck: Supple, with full range of motion. No jugular venous distention. Trachea midline. Respiratory:  Normal respiratory effort. Clear to auscultation, bilaterally without Rales/Wheezes/Rhonchi. Cardiovascular: Regular rate and rhythm with normal S1/S2 without murmurs, rubs or gallops. Abdomen: Soft, non-tender, non-distended with normal bowel sounds. Musculoskeletal:  No clubbing, cyanosis or edema bilaterally. Skin: Skin color, texture, turgor normal.  No rashes or lesions. Neurologic:  Neurovascularly intact without any focal sensory/motor deficits. Cranial nerves: II-XII intact, grossly non-focal.  Psychiatric: Alert and oriented, thought content appropriate, normal insight  Capillary Refill: Brisk,< 3 seconds   Peripheral Pulses: +2 palpable, equal bilaterally     Labs:   Recent Labs     09/07/20 2005 09/08/20  0725   WBC 11.9* 8.9   HGB 16.5 17.1   HCT 48.6 50.6    267     Recent Labs     09/07/20 2005 09/08/20  0725    136   K 4.2 4.7    102   CO2 23 23   BUN 7 7   CREATININE 0.7 0.5   CALCIUM 9.5 9.9     Recent Labs     09/07/20 2005   AST 34   ALT 43   BILITOT 0.5   ALKPHOS 134*     No results for input(s): INR in the last 72 hours. No results for input(s): Thelda Rough in the last 72 hours.     Microbiology:    Blood culture #1:   Lab Results   Component Value Date    BC No growth-preliminary  09/07/2020    BC No growth-preliminary  09/07/2020       Blood culture #2:No results found for: Keya Scherer    Organism:No results found for: ORG    No results found for: LABGRAM    MRSA culture only:No results found for: [facility]    Urine culture: No results found for: LABURIN    Respiratory culture: No results found for: CULTRESP    Aerobic and Anaerobic :  No results found for: LABAERO  No results found for: LABANAE    Urinalysis: No results found for: Adarsh Katos, BACTERIA, RBCUA, BLOODU, SPECGRAV, [address]    Radiology:  CT SOFT TISSUE NECK W CONTRAST   Final Result   1. The right parotid gland is enlarged measuring 5.1 x 2.0 x 4.3 cm. There is mixed attenuation throughout the enlarged right parotid gland suggesting a combination of abscess formation and phlegmon. There are stranding densities consistent with    inflammation within the adjacent subcutaneous tissues. There are numerous cervical lymph nodes which most likely are reactive. There is no associated osseous or vascular abnormality. Appropriate clinical management is recommended. **This report has been created using voice recognition software. It may contain minor errors which are inherent in voice recognition technology. **      Final report electronically signed by Dr. Joshua Coombs on 2020 9:06 PM        Ct Soft Tissue Neck W Contrast    Result Date: 2020  PROCEDURE: CT SOFT TISSUE NECK W CONTRAST CLINICAL INFORMATION: Right mandibular pain and swelling COMPARISON: No prior study. TECHNIQUE: 3 mm axial imaging through the neck with intravenous contrast from the base of the brain to superior aspect of the heart. All CT scans at this facility use dose modulation, iterative reconstruction, and/or weight based dosing when appropriate to reduce the radiation dose to as low as reasonably achievable. CONTRAST: 80 mL Isovue-370 intravenously. FINDINGS: PHARYNX/ORAL CAVITY/TONSILS: Unremarkable. EPIGLOTTIS: Unremarkable. VALLECULA/PIRIFORM SINUSES: Unremarkable. LARYNX: Unremarkable. CERVICAL LYMPHADENOPATHY: 1. There are no pathologically enlarged lymph nodes. SALIVARY GLANDS/THYROID GLAND: 1. The right parotid gland is enlarged measuring 5.1 x 2.0 x 4.3 cm. There is mixed attenuation throughout the enlarged right parotid gland suggesting a combination of abscess formation and phlegmon.  There are stranding densities consistent with inflammation within the adjacent

## 2020-09-09 ENCOUNTER — APPOINTMENT (OUTPATIENT)
Dept: CT IMAGING | Age: 20
DRG: 156 | End: 2020-09-09

## 2020-09-09 LAB
ERYTHROCYTE [DISTWIDTH] IN BLOOD BY AUTOMATED COUNT: 12.8 % (ref 11.5–14.5)
ERYTHROCYTE [DISTWIDTH] IN BLOOD BY AUTOMATED COUNT: 41.2 FL (ref 35–45)
HCT VFR BLD CALC: 43.3 % (ref 42–52)
HEMOGLOBIN: 14.8 GM/DL (ref 14–18)
MCH RBC QN AUTO: 30.3 PG (ref 26–33)
MCHC RBC AUTO-ENTMCNC: 34.2 GM/DL (ref 32.2–35.5)
MCV RBC AUTO: 88.7 FL (ref 80–94)
PLATELET # BLD: 248 THOU/MM3 (ref 130–400)
PMV BLD AUTO: 8.9 FL (ref 9.4–12.4)
RBC # BLD: 4.88 MILL/MM3 (ref 4.7–6.1)
WBC # BLD: 12.2 THOU/MM3 (ref 4.8–10.8)

## 2020-09-09 PROCEDURE — 70491 CT SOFT TISSUE NECK W/DYE: CPT

## 2020-09-09 PROCEDURE — 2580000003 HC RX 258: Performed by: NURSE PRACTITIONER

## 2020-09-09 PROCEDURE — 85027 COMPLETE CBC AUTOMATED: CPT

## 2020-09-09 PROCEDURE — G0378 HOSPITAL OBSERVATION PER HR: HCPCS

## 2020-09-09 PROCEDURE — 6360000002 HC RX W HCPCS: Performed by: OTOLARYNGOLOGY

## 2020-09-09 PROCEDURE — 6360000002 HC RX W HCPCS: Performed by: NURSE PRACTITIONER

## 2020-09-09 PROCEDURE — 99225 PR SBSQ OBSERVATION CARE/DAY 25 MINUTES: CPT | Performed by: PHYSICIAN ASSISTANT

## 2020-09-09 PROCEDURE — 2580000003 HC RX 258: Performed by: PHYSICIAN ASSISTANT

## 2020-09-09 PROCEDURE — 6370000000 HC RX 637 (ALT 250 FOR IP): Performed by: PHYSICIAN ASSISTANT

## 2020-09-09 PROCEDURE — 2580000003 HC RX 258: Performed by: OTOLARYNGOLOGY

## 2020-09-09 PROCEDURE — 6360000002 HC RX W HCPCS: Performed by: PHYSICIAN ASSISTANT

## 2020-09-09 PROCEDURE — 6360000004 HC RX CONTRAST MEDICATION: Performed by: PHYSICIAN ASSISTANT

## 2020-09-09 PROCEDURE — 96367 TX/PROPH/DG ADDL SEQ IV INF: CPT

## 2020-09-09 PROCEDURE — 94760 N-INVAS EAR/PLS OXIMETRY 1: CPT

## 2020-09-09 PROCEDURE — 96366 THER/PROPH/DIAG IV INF ADDON: CPT

## 2020-09-09 PROCEDURE — 36415 COLL VENOUS BLD VENIPUNCTURE: CPT

## 2020-09-09 RX ADMIN — PIPERACILLIN AND TAZOBACTAM 3.38 G: 3; .375 INJECTION, POWDER, LYOPHILIZED, FOR SOLUTION INTRAVENOUS at 06:16

## 2020-09-09 RX ADMIN — HYDROCODONE BITARTRATE AND ACETAMINOPHEN 2 TABLET: 5; 325 TABLET ORAL at 09:23

## 2020-09-09 RX ADMIN — HYDROCODONE BITARTRATE AND ACETAMINOPHEN 2 TABLET: 5; 325 TABLET ORAL at 02:53

## 2020-09-09 RX ADMIN — HYDROCODONE BITARTRATE AND ACETAMINOPHEN 2 TABLET: 5; 325 TABLET ORAL at 18:21

## 2020-09-09 RX ADMIN — SODIUM CHLORIDE: 9 INJECTION, SOLUTION INTRAVENOUS at 09:23

## 2020-09-09 RX ADMIN — CEFEPIME HYDROCHLORIDE 1 G: 1 INJECTION, POWDER, FOR SOLUTION INTRAMUSCULAR; INTRAVENOUS at 21:31

## 2020-09-09 RX ADMIN — IOPAMIDOL 80 ML: 755 INJECTION, SOLUTION INTRAVENOUS at 13:13

## 2020-09-09 RX ADMIN — SODIUM CHLORIDE: 9 INJECTION, SOLUTION INTRAVENOUS at 20:32

## 2020-09-09 RX ADMIN — HYDROCODONE BITARTRATE AND ACETAMINOPHEN 2 TABLET: 5; 325 TABLET ORAL at 23:22

## 2020-09-09 RX ADMIN — CEFEPIME HYDROCHLORIDE 1 G: 1 INJECTION, POWDER, FOR SOLUTION INTRAMUSCULAR; INTRAVENOUS at 14:21

## 2020-09-09 RX ADMIN — VANCOMYCIN HYDROCHLORIDE 1250 MG: 5 INJECTION, POWDER, LYOPHILIZED, FOR SOLUTION INTRAVENOUS at 15:27

## 2020-09-09 RX ADMIN — HYDROCODONE BITARTRATE AND ACETAMINOPHEN 2 TABLET: 5; 325 TABLET ORAL at 14:21

## 2020-09-09 ASSESSMENT — PAIN DESCRIPTION - LOCATION
LOCATION: FACE

## 2020-09-09 ASSESSMENT — PAIN SCALES - GENERAL
PAINLEVEL_OUTOF10: 8
PAINLEVEL_OUTOF10: 8
PAINLEVEL_OUTOF10: 2
PAINLEVEL_OUTOF10: 8
PAINLEVEL_OUTOF10: 4
PAINLEVEL_OUTOF10: 8
PAINLEVEL_OUTOF10: 7
PAINLEVEL_OUTOF10: 7

## 2020-09-09 ASSESSMENT — PAIN DESCRIPTION - DESCRIPTORS
DESCRIPTORS: ACHING;DISCOMFORT

## 2020-09-09 ASSESSMENT — PAIN DESCRIPTION - ONSET
ONSET: ON-GOING

## 2020-09-09 ASSESSMENT — PAIN DESCRIPTION - PROGRESSION
CLINICAL_PROGRESSION: NOT CHANGED

## 2020-09-09 ASSESSMENT — ENCOUNTER SYMPTOMS
GASTROINTESTINAL NEGATIVE: 1
RHINORRHEA: 0
EYES NEGATIVE: 1
COUGH: 0
FACIAL SWELLING: 1
ALLERGIC/IMMUNOLOGIC NEGATIVE: 1
VOICE CHANGE: 0
STRIDOR: 0
SHORTNESS OF BREATH: 0

## 2020-09-09 ASSESSMENT — PAIN DESCRIPTION - FREQUENCY
FREQUENCY: CONTINUOUS

## 2020-09-09 ASSESSMENT — PAIN DESCRIPTION - ORIENTATION
ORIENTATION: RIGHT

## 2020-09-09 ASSESSMENT — PAIN - FUNCTIONAL ASSESSMENT
PAIN_FUNCTIONAL_ASSESSMENT: ACTIVITIES ARE NOT PREVENTED

## 2020-09-09 ASSESSMENT — PAIN DESCRIPTION - DIRECTION
RADIATING_TOWARDS: CHEEK

## 2020-09-09 ASSESSMENT — PAIN DESCRIPTION - PAIN TYPE
TYPE: ACUTE PAIN

## 2020-09-09 NOTE — PLAN OF CARE
Problem: Pain:  Goal: Pain level will decrease  Description: Pain level will decrease  Outcome: Ongoing  Note: Pain Assessment: 0-10  Pain Level: 5   Patient's Stated Pain Goal: 4   Is pain goal met at this time? Yes     Non-Pharmaceutical Pain Intervention(s): Repositioned, Rest, Heat applied  PRN pain medication being given. Problem: SAFETY  Goal: Free from accidental physical injury  Outcome: Ongoing  Note: Patient is free if accidental physical injury this shift. Bed in lowest position. Call light and personal items within reach. Bed alarm on. Problem: SKIN INTEGRITY  Goal: Skin integrity is maintained or improved  Outcome: Ongoing  Note: Patients right side of face is swollen. Warm compress applied. Problem: Discharge Planning:  Goal: Discharged to appropriate level of care  Description: Discharged to appropriate level of care  Outcome: Ongoing  Note: Patient plans to return home at discharge. Care plan reviewed with patient. Patient verbalize understanding of the plan of care and contribute to goal setting.

## 2020-09-09 NOTE — PROGRESS NOTES
Department of Otolaryngology  Progress Note    Chief Complaint: Right jaw swelling and pain    SUBJECTIVE: The patient reports that he feels about the same as yesterday. He reports that the pain in the right side of his jaw has persisted reports approximately a pain level of 7 out of 10. He denies any improvement in the right facial swelling. The patient reports that it is now slightly painful when he speaks. He reports that this is new and previously had no pain with speaking. He denies changes in voice, shortness of breath, fever, chills. The patient was afebrile overnight. Interval ovation in white blood cell count from 8.9 yesterday to 12.2 today. The patient had received doses of clindamycin and Unasyn prior to being transitioned to Zosyn yesterday afternoon. Review of Systems   Constitutional: Negative for chills and fever. HENT: Positive for ear pain (mild right ear otalgia) and facial swelling. Negative for congestion, hearing loss, postnasal drip, rhinorrhea, tinnitus and voice change. Eyes: Negative. Respiratory: Negative for cough, shortness of breath and stridor. Cardiovascular: Negative. Gastrointestinal: Negative. Musculoskeletal: Negative. Allergic/Immunologic: Negative. Neurological: Negative. OBJECTIVE      Physical  VITALS:  /65   Pulse 90   Temp 98.9 °F (37.2 °C) (Oral)   Resp 16   Ht 6' 5\" (1.956 m)   Wt 271 lb (122.9 kg)   SpO2 97%   BMI 32.14 kg/m²     This is a 23 y.o. male. Patient is alert and oriented to person, place and time. Patient appears well developed, well nourished. Mood is happy with normal affect. Not obviously hearing impaired. Head:   Significant edema of the right face overlying the right parotid. Ears:  External ears: Normal: no scars, lesions or masses. Mastoid process: No erythema noted. No tenderness to palpation.   R External auditory canal: clear and free of any pathology  L External auditory canal: clear and free of any pathology   Tympanic membranes:  R intact, translucent                                                  L intact, translucent    Mouth/Throat:  Lips, tongue and oral cavity: Normal. No masses or lesions noted   Dentition: good, no malocclusion  Oral mucosa: moist but difficult to visualize secondary to +trismus  Tonsils: Could not visualize  Oropharynx: Could not visualize secondary to +trismus  Hard and soft palates: symmetrical and intact. Salivary glands: Right parotid is significantly edematous. It is very firm and tender to palpation. Unable to express saliva/purulent discharge from right parotid duct. Bilateral angles of the mandible are palpable. +trismus    Neck: Trachea midline. Thyroid not enlarged, no palpable masses or tenderness. Lymphatic: Cervical lymphadenopathy is palpable especially on the right. Eyes: ROXANA, EOM intact. Conjunctiva moist without discharge. Lungs: Normal effort of breathing, not obviously distressed. Neuro: Cranial nerves II-XII grossly intact. Extremities: No clubbing, edema, or cyanosis noted. Data  CBC:   Lab Results   Component Value Date    WBC 12.2 09/09/2020    RBC 4.88 09/09/2020    HGB 14.8 09/09/2020    HCT 43.3 09/09/2020    MCV 88.7 09/09/2020    MCH 30.3 09/09/2020    MCHC 34.2 09/09/2020     09/09/2020    MPV 8.9 09/09/2020   Interval increase in WBC. Increased to 12.2 from 8.9 yesterday. CT Soft Tissue Neck W Contrast 9/7/20-  FINDINGS:    PHARYNX/ORAL CAVITY/TONSILS: Unremarkable.         EPIGLOTTIS: Unremarkable.         VALLECULA/PIRIFORM SINUSES: Unremarkable.         LARYNX: Unremarkable.         CERVICAL LYMPHADENOPATHY:    1. There are no pathologically enlarged lymph nodes.         SALIVARY GLANDS/THYROID GLAND: 1. The right parotid gland is enlarged measuring 5.1 x 2.0 x 4.3 cm. There is mixed attenuation throughout the enlarged right parotid gland suggesting a combination of abscess formation and phlegmon.  There are stranding    densities consistent with inflammation within the adjacent subcutaneous tissues. There are numerous cervical lymph nodes which most likely are reactive. There is no associated osseous or vascular abnormality. 2. The left parotid gland is normal.    3. The thyroid gland is normal.         INFLAMMATION:    1. As previously described.         VASCULATURE: Unremarkable.         LUNG APICES: Unremarkable.         OSSEOUS STRUCTURES: Unremarkable.         PARANASAL SINUSES: Unremarkable.         BRAIN: Unremarkable.         OTHER: None.                   Impression    1. The right parotid gland is enlarged measuring 5.1 x 2.0 x 4.3 cm. There is mixed attenuation throughout the enlarged right parotid gland suggesting a combination of abscess formation and phlegmon. There are stranding densities consistent with    inflammation within the adjacent subcutaneous tissues. There are numerous cervical lymph nodes which most likely are reactive. There is no associated osseous or vascular abnormality. Appropriate clinical management is recommended.       Inpatient Medications  Current Facility-Administered Medications: piperacillin-tazobactam (ZOSYN) 3.375 g in dextrose 5 % 50 mL IVPB extended infusion (mini-bag), 3.375 g, Intravenous, Q8H  sodium chloride flush 0.9 % injection 10 mL, 10 mL, Intravenous, 2 times per day  sodium chloride flush 0.9 % injection 10 mL, 10 mL, Intravenous, PRN  acetaminophen (TYLENOL) tablet 650 mg, 650 mg, Oral, Q6H PRN **OR** acetaminophen (TYLENOL) suppository 650 mg, 650 mg, Rectal, Q6H PRN  polyethylene glycol (GLYCOLAX) packet 17 g, 17 g, Oral, Daily PRN  promethazine (PHENERGAN) tablet 12.5 mg, 12.5 mg, Oral, Q6H PRN **OR** ondansetron (ZOFRAN) injection 4 mg, 4 mg, Intravenous, Q6H PRN  enoxaparin (LOVENOX) injection 40 mg, 40 mg, Subcutaneous, Daily  0.9 % sodium chloride infusion, , Intravenous, Continuous  HYDROcodone-acetaminophen (NORCO) 5-325 MG per tablet 1 tablet, 1

## 2020-09-09 NOTE — PLAN OF CARE
Problem: Pain:  Goal: Pain level will decrease  Description: Pain level will decrease  Outcome: Ongoing  Note: Patient reporting pain 8/10 with goal of 2/10. Patient satisfied with current interventions including rest and repositioning. Pain assessments ongoing. Problem: Discharge Planning:  Goal: Discharged to appropriate level of care  Description: Discharged to appropriate level of care  Outcome: Ongoing  Note: Discharge plans to return home discussed with patient. Problem: Falls - Risk of:  Goal: Will remain free from falls  Description: Will remain free from falls  Outcome: Ongoing  Note: Patient free from falls this shift. Patient using call light appropriately. Call light in reach, fall sign posted, nonskid footwear on, hourly rounding, bed alarm on, bed rails up x2. Patient at risk for falls due to generalized weakness. Problem: Skin Integrity:  Goal: Demonstration of wound healing without infection will improve  Description: Demonstration of wound healing without infection will improve  Outcome: Ongoing  Note: Patient afebrile and denies chills. Patient to have abscess drained tomorrow. Antibiotics changed to vanc and cefepime today. Care plan reviewed with patient. Patient verbalizes understanding of the plan of care and contributes to goal setting.

## 2020-09-09 NOTE — PROGRESS NOTES
Pharmacy Note  Vancomycin Consult    Ray Garces is a 23 y.o. male started on Vancomycin for parotitis; consult received from Rosalia VALLECILLO to manage therapy. Also receiving the following antibiotics: cefepime. Patient Active Problem List   Diagnosis    Abscess of parotid gland       Allergies:  Patient has no known allergies. Temp max: afebrile    Recent Labs     09/07/20 2005 09/08/20  0725   BUN 7 7       Recent Labs     09/07/20 2005 09/08/20  0725   CREATININE 0.7 0.5       Recent Labs     09/08/20  0725 09/09/20  0740   WBC 8.9 12.2*         Intake/Output Summary (Last 24 hours) at 9/9/2020 1312  Last data filed at 9/9/2020 0255  Gross per 24 hour   Intake 2964.55 ml   Output --   Net 2964.55 ml     Cultures/Sensitivites  Date Source Result   9/7/2020 BC1 ngtd   9/7/2020 BC2 ngtd       Ht Readings from Last 1 Encounters:   09/07/20 6' 5\" (1.956 m)        Wt Readings from Last 1 Encounters:   09/07/20 271 lb (122.9 kg)         Body mass index is 32.14 kg/m². Estimated Creatinine Clearance: 336 mL/min (based on SCr of 0.5 mg/dL). Goal Trough Level: 10-15 mcg/mL    Assessment/Plan:  Will initiate vancomycin 1250mg q8h  Timing of trough level will be determined based on culture results, renal function, and clinical response. Thank you for the consult. Will continue to follow.

## 2020-09-09 NOTE — PROGRESS NOTES
Hospitalist Progress Note      Patient:  Christelle Brooke    Unit/Bed:5K-11/011-A  YOB: 2000  MRN: [id number]   Acct: [de-identified]   PCP: No primary care provider on file. Date of Admission: 9/7/2020    Assessment/Plan:    1. Right Parotid Gland Abscess with phlegmon: 5.1x2.0x4.3cm right parotid gland abscess and phlegmon, cervical lymphadenopathy. On admission, WBC 11.9. Unasyn initiated 9/8/20, Solu-medrol x1, morphine for analgesia in ED. BC x2 pending. ENT consulted for assistance. 9/8-> WBC now wnl, and afebrile, however, minimal improvement in pain/swelling/PO tolerance. Discussed with ENT, they are recommending increasing Unasyn to 3g q6h, starting Levaquin and continuing sialogogues, heat, and massage. If no improvement, they are recommending consideration of repeat CT to monitor abscess. 9/9-> had been transitioned to Zosyn yesterday. Pt clinically worse with pain, swelling, and now difficulty speaking. Per ENT, will repeat CT and switch to Vanc and Cefepime. WBC increased from 8.9 to 12.2. NGTD on blood cx x2. Chief Complaint: Right Dental Pain    Initial H and P:-    Christelle Brooke is a 24 y/o  male without significant past medical history who presented to [facility] on 9/7/20 c/o right jaw pain x1 week. He states that he visited his dentist five days ago and they recommended he have wisdom teeth removed in 12/2020. Pain persisted throughout the week and became unbearable with Ibuprofen/Tylenol regimen at home. He states that he has had decreased PO intake due to the pain associated with chewing. He denies any ear pain, sore throat, vision changes, sour taste in mouth, fever, or chills. No increased salivation. Aby Glover He denies any chest pain, SOB, or productive cough. Workup in ED revealed a right parotid gland abscess with phlegmon. He was started on Unasyn in ED and treated with Solumedrol and morphine.  ENT was consulted by ED who recommended admission and monitoring overnight. If he feels better in AM goal would be to discharge with plan for outpatient ENT. . Patient admitted under hospitalist service for further evaluation and treatment. 9/8-> Patient evaluated resting in his bed, attempting to eat soup. He states that the Norco helps for brief periods of time with the right jaw pain. Reports he is unable to open his mouth any further than he was yesterday. Denies any fever or chills. Denies any ear pain, vision changes, blurry vision, rhinorrhea, or nasal congestion. Denies any sour taste in his mouth. Reports good dental hygiene. Discussed case with patient regarding ENT recommendations. He understands is agreeable with current plan of care. No further questions at this time. Subjective (past 24 hours):   9/9-> pt reports feeling about the same as yesterday, reports 7/10 pain in his right side mouth. Pt does note that he is having more difficulty and pain wth speaking whereas before he did not. . Eating/drinking has been only okay, difficult as pt cannot move his mouth. Denies CP/SOB. Denies abd pain, n/v/d/c. Past medical history, family history, social history and allergies reviewed again and is unchanged since admission. ROS (12 point review of systems completed. Pertinent positives noted.  Otherwise ROS is negative)     Medications:  Reviewed    Infusion Medications    sodium chloride 100 mL/hr at 09/08/20 2111     Scheduled Medications    piperacillin-tazobactam  3.375 g Intravenous Q8H    sodium chloride flush  10 mL Intravenous 2 times per day    enoxaparin  40 mg Subcutaneous Daily     PRN Meds: sodium chloride flush, acetaminophen **OR** acetaminophen, polyethylene glycol, promethazine **OR** ondansetron, HYDROcodone 5 mg - acetaminophen **OR** HYDROcodone 5 mg - acetaminophen      Intake/Output Summary (Last 24 hours) at 9/9/2020 0689  Last data filed at 9/9/2020 0255  Gross per 24 hour   Intake 2964.55 ml   Output --   Net 2964.55 ml       Diet:  DIET GENERAL;    Exam:  /64   Pulse 88   Temp 98.9 °F (37.2 °C) (Oral)   Resp 18   Ht 6' 5\" (1.956 m)   Wt 271 lb (122.9 kg)   SpO2 98%   BMI 32.14 kg/m²   General appearance: No apparent distress, appears stated age and cooperative. HEENT: Normal cephalic, atraumatic. Swelling, erythema, and TTP over the right parotid gland. Opens mouth approximately 2cm wide, no further secondary to pain. No obvious purulence from parotid duct. TTP right submandibular region. Anterior cervical chain lymphadenopathy, nontender. Pupils equal, round, and reactive to light. Extra ocular muscles intact. Conjunctivae/corneas clear. No tragal tenderness. Neck: Supple, with full range of motion. No jugular venous distention. Trachea midline. Respiratory:  Normal respiratory effort. Clear to auscultation, bilaterally without Rales/Wheezes/Rhonchi. Cardiovascular: Regular rate and rhythm with normal S1/S2 without murmurs, rubs or gallops. Abdomen: Soft, non-tender, non-distended with normal bowel sounds. Musculoskeletal:  No clubbing, cyanosis or edema bilaterally. Skin: Skin color, texture, turgor normal.  No rashes or lesions. Neurologic:  Neurovascularly intact without any focal sensory/motor deficits. Cranial nerves: II-XII intact, grossly non-focal.  Psychiatric: Alert and oriented, thought content appropriate, normal insight  Capillary Refill: Brisk,< 3 seconds   Peripheral Pulses: +2 palpable, equal bilaterally     Labs:   Recent Labs     09/07/20 2005 09/08/20  0725   WBC 11.9* 8.9   HGB 16.5 17.1   HCT 48.6 50.6    267     Recent Labs     09/07/20 2005 09/08/20  0725    136   K 4.2 4.7    102   CO2 23 23   BUN 7 7   CREATININE 0.7 0.5   CALCIUM 9.5 9.9     Recent Labs     09/07/20 2005   AST 34   ALT 43   BILITOT 0.5   ALKPHOS 134*     No results for input(s): INR in the last 72 hours.   No results for input(s): Serene Salinas in the last intravenously. FINDINGS: PHARYNX/ORAL CAVITY/TONSILS: Unremarkable. EPIGLOTTIS: Unremarkable. VALLECULA/PIRIFORM SINUSES: Unremarkable. LARYNX: Unremarkable. CERVICAL LYMPHADENOPATHY: 1. There are no pathologically enlarged lymph nodes. SALIVARY GLANDS/THYROID GLAND: 1. The right parotid gland is enlarged measuring 5.1 x 2.0 x 4.3 cm. There is mixed attenuation throughout the enlarged right parotid gland suggesting a combination of abscess formation and phlegmon. There are stranding densities consistent with inflammation within the adjacent subcutaneous tissues. There are numerous cervical lymph nodes which most likely are reactive. There is no associated osseous or vascular abnormality. 2. The left parotid gland is normal. 3. The thyroid gland is normal. INFLAMMATION: 1. As previously described. VASCULATURE: Unremarkable. LUNG APICES: Unremarkable. OSSEOUS STRUCTURES: Unremarkable. PARANASAL SINUSES: Unremarkable. BRAIN: Unremarkable. OTHER: None. 1. The right parotid gland is enlarged measuring 5.1 x 2.0 x 4.3 cm. There is mixed attenuation throughout the enlarged right parotid gland suggesting a combination of abscess formation and phlegmon. There are stranding densities consistent with inflammation within the adjacent subcutaneous tissues. There are numerous cervical lymph nodes which most likely are reactive. There is no associated osseous or vascular abnormality. Appropriate clinical management is recommended. **This report has been created using voice recognition software. It may contain minor errors which are inherent in voice recognition technology. ** Final report electronically signed by Dr. Daniel Thrasher on 9/7/2020 9:06 PM      Electronically signed by Holland Valdez PA-C on 9/9/2020 at 6:35 AM

## 2020-09-10 ENCOUNTER — APPOINTMENT (OUTPATIENT)
Dept: ULTRASOUND IMAGING | Age: 20
DRG: 156 | End: 2020-09-10

## 2020-09-10 LAB
ANION GAP SERPL CALCULATED.3IONS-SCNC: 10 MEQ/L (ref 8–16)
BASOPHILS # BLD: 0.3 %
BASOPHILS ABSOLUTE: 0 THOU/MM3 (ref 0–0.1)
BUN BLDV-MCNC: 9 MG/DL (ref 7–22)
CALCIUM SERPL-MCNC: 9.1 MG/DL (ref 8.5–10.5)
CHLORIDE BLD-SCNC: 101 MEQ/L (ref 98–111)
CO2: 26 MEQ/L (ref 23–33)
CREAT SERPL-MCNC: 0.6 MG/DL (ref 0.4–1.2)
EOSINOPHIL # BLD: 0.4 %
EOSINOPHILS ABSOLUTE: 0 THOU/MM3 (ref 0–0.4)
ERYTHROCYTE [DISTWIDTH] IN BLOOD BY AUTOMATED COUNT: 12.6 % (ref 11.5–14.5)
ERYTHROCYTE [DISTWIDTH] IN BLOOD BY AUTOMATED COUNT: 41.8 FL (ref 35–45)
GFR SERPL CREATININE-BSD FRML MDRD: > 90 ML/MIN/1.73M2
GLUCOSE BLD-MCNC: 109 MG/DL (ref 70–108)
HCT VFR BLD CALC: 42.6 % (ref 42–52)
HEMOGLOBIN: 14.5 GM/DL (ref 14–18)
IMMATURE GRANS (ABS): 0.03 THOU/MM3 (ref 0–0.07)
IMMATURE GRANULOCYTES: 0.3 %
LYMPHOCYTES # BLD: 16.7 %
LYMPHOCYTES ABSOLUTE: 1.8 THOU/MM3 (ref 1–4.8)
MCH RBC QN AUTO: 30.5 PG (ref 26–33)
MCHC RBC AUTO-ENTMCNC: 34 GM/DL (ref 32.2–35.5)
MCV RBC AUTO: 89.7 FL (ref 80–94)
MONOCYTES # BLD: 9.6 %
MONOCYTES ABSOLUTE: 1 THOU/MM3 (ref 0.4–1.3)
NUCLEATED RED BLOOD CELLS: 0 /100 WBC
PLATELET # BLD: 232 THOU/MM3 (ref 130–400)
PMV BLD AUTO: 9 FL (ref 9.4–12.4)
POTASSIUM SERPL-SCNC: 3.8 MEQ/L (ref 3.5–5.2)
RBC # BLD: 4.75 MILL/MM3 (ref 4.7–6.1)
SEG NEUTROPHILS: 72.7 %
SEGMENTED NEUTROPHILS ABSOLUTE COUNT: 7.6 THOU/MM3 (ref 1.8–7.7)
SODIUM BLD-SCNC: 137 MEQ/L (ref 135–145)
WBC # BLD: 10.5 THOU/MM3 (ref 4.8–10.8)

## 2020-09-10 PROCEDURE — 0C983ZX DRAINAGE OF RIGHT PAROTID GLAND, PERCUTANEOUS APPROACH, DIAGNOSTIC: ICD-10-PCS | Performed by: RADIOLOGY

## 2020-09-10 PROCEDURE — 99231 SBSQ HOSP IP/OBS SF/LOW 25: CPT | Performed by: PHYSICIAN ASSISTANT

## 2020-09-10 PROCEDURE — 6360000002 HC RX W HCPCS: Performed by: OTOLARYNGOLOGY

## 2020-09-10 PROCEDURE — 88112 CYTOPATH CELL ENHANCE TECH: CPT

## 2020-09-10 PROCEDURE — 1200000000 HC SEMI PRIVATE

## 2020-09-10 PROCEDURE — 6360000002 HC RX W HCPCS: Performed by: PHYSICIAN ASSISTANT

## 2020-09-10 PROCEDURE — 6370000000 HC RX 637 (ALT 250 FOR IP): Performed by: PHYSICIAN ASSISTANT

## 2020-09-10 PROCEDURE — 87070 CULTURE OTHR SPECIMN AEROBIC: CPT

## 2020-09-10 PROCEDURE — 96376 TX/PRO/DX INJ SAME DRUG ADON: CPT

## 2020-09-10 PROCEDURE — 87205 SMEAR GRAM STAIN: CPT

## 2020-09-10 PROCEDURE — 96366 THER/PROPH/DIAG IV INF ADDON: CPT

## 2020-09-10 PROCEDURE — 36415 COLL VENOUS BLD VENIPUNCTURE: CPT

## 2020-09-10 PROCEDURE — 87077 CULTURE AEROBIC IDENTIFY: CPT

## 2020-09-10 PROCEDURE — 85025 COMPLETE CBC W/AUTO DIFF WBC: CPT

## 2020-09-10 PROCEDURE — 94760 N-INVAS EAR/PLS OXIMETRY 1: CPT

## 2020-09-10 PROCEDURE — 10160 PNXR ASPIR ABSC HMTMA BULLA: CPT

## 2020-09-10 PROCEDURE — 2580000003 HC RX 258: Performed by: PHYSICIAN ASSISTANT

## 2020-09-10 PROCEDURE — 80048 BASIC METABOLIC PNL TOTAL CA: CPT

## 2020-09-10 PROCEDURE — 87075 CULTR BACTERIA EXCEPT BLOOD: CPT

## 2020-09-10 PROCEDURE — 2580000003 HC RX 258: Performed by: OTOLARYNGOLOGY

## 2020-09-10 PROCEDURE — 88305 TISSUE EXAM BY PATHOLOGIST: CPT

## 2020-09-10 RX ADMIN — SODIUM CHLORIDE: 9 INJECTION, SOLUTION INTRAVENOUS at 09:50

## 2020-09-10 RX ADMIN — HYDROCODONE BITARTRATE AND ACETAMINOPHEN 2 TABLET: 5; 325 TABLET ORAL at 04:56

## 2020-09-10 RX ADMIN — HYDROCODONE BITARTRATE AND ACETAMINOPHEN 2 TABLET: 5; 325 TABLET ORAL at 11:53

## 2020-09-10 RX ADMIN — SODIUM CHLORIDE: 9 INJECTION, SOLUTION INTRAVENOUS at 21:59

## 2020-09-10 RX ADMIN — VANCOMYCIN HYDROCHLORIDE 1250 MG: 5 INJECTION, POWDER, LYOPHILIZED, FOR SOLUTION INTRAVENOUS at 21:59

## 2020-09-10 RX ADMIN — HYDROCODONE BITARTRATE AND ACETAMINOPHEN 2 TABLET: 5; 325 TABLET ORAL at 17:49

## 2020-09-10 RX ADMIN — CEFEPIME HYDROCHLORIDE 1 G: 1 INJECTION, POWDER, FOR SOLUTION INTRAMUSCULAR; INTRAVENOUS at 15:05

## 2020-09-10 RX ADMIN — CEFEPIME HYDROCHLORIDE 1 G: 1 INJECTION, POWDER, FOR SOLUTION INTRAMUSCULAR; INTRAVENOUS at 06:33

## 2020-09-10 RX ADMIN — VANCOMYCIN HYDROCHLORIDE 1250 MG: 5 INJECTION, POWDER, LYOPHILIZED, FOR SOLUTION INTRAVENOUS at 13:15

## 2020-09-10 RX ADMIN — CEFEPIME HYDROCHLORIDE 1 G: 1 INJECTION, POWDER, FOR SOLUTION INTRAMUSCULAR; INTRAVENOUS at 23:48

## 2020-09-10 ASSESSMENT — PAIN DESCRIPTION - DESCRIPTORS
DESCRIPTORS: ACHING;DISCOMFORT

## 2020-09-10 ASSESSMENT — PAIN DESCRIPTION - PROGRESSION
CLINICAL_PROGRESSION: NOT CHANGED

## 2020-09-10 ASSESSMENT — PAIN SCALES - GENERAL
PAINLEVEL_OUTOF10: 7
PAINLEVEL_OUTOF10: 7
PAINLEVEL_OUTOF10: 4
PAINLEVEL_OUTOF10: 4
PAINLEVEL_OUTOF10: 7
PAINLEVEL_OUTOF10: 4
PAINLEVEL_OUTOF10: 10
PAINLEVEL_OUTOF10: 7

## 2020-09-10 ASSESSMENT — PAIN - FUNCTIONAL ASSESSMENT
PAIN_FUNCTIONAL_ASSESSMENT: ACTIVITIES ARE NOT PREVENTED

## 2020-09-10 ASSESSMENT — PAIN DESCRIPTION - PAIN TYPE
TYPE: ACUTE PAIN

## 2020-09-10 ASSESSMENT — PAIN DESCRIPTION - ORIENTATION
ORIENTATION: RIGHT

## 2020-09-10 ASSESSMENT — PAIN DESCRIPTION - DIRECTION
RADIATING_TOWARDS: CHEEK

## 2020-09-10 ASSESSMENT — PAIN DESCRIPTION - FREQUENCY
FREQUENCY: CONTINUOUS

## 2020-09-10 ASSESSMENT — PAIN DESCRIPTION - ONSET
ONSET: ON-GOING

## 2020-09-10 ASSESSMENT — PAIN DESCRIPTION - LOCATION
LOCATION: FACE

## 2020-09-10 NOTE — PLAN OF CARE
Problem: Pain:  Goal: Pain level will decrease  Description: Pain level will decrease  Outcome: Ongoing  Note: Pain Assessment: 0-10  Pain Level: 5   Patient's Stated Pain Goal: 4   Is pain goal met at this time? Yes     Non-Pharmaceutical Pain Intervention(s): Repositioned, Rest, Heat applied  PRN pain medication being given. Problem: SAFETY  Goal: Free from accidental physical injury  Outcome: Ongoing  Note: Patient is free if accidental physical injury this shift. Bed in lowest position. Call light and personal items within reach. Bed alarm on. Problem: SKIN INTEGRITY  Goal: Skin integrity is maintained or improved  Outcome: Ongoing  Note: Patients right side of face is swollen. Warm compress applied. CHG shower tonight, pt ot go to IR tomorrow for I&D. Problem: Discharge Planning:  Goal: Discharged to appropriate level of care  Description: Discharged to appropriate level of care  Outcome: Ongoing  Note: Patient plans to return home at discharge. Care plan reviewed with patient. Patient verbalize understanding of the plan of care and contribute to goal setting.

## 2020-09-10 NOTE — CARE COORDINATION
9/10/20, 4:47 PM EDT    DISCHARGE  [address] day: 0  Location: -11/011-A Reason for admit: Abscess of parotid gland [K11.3]  Abscess of parotid gland [K11.3]   Procedure: 9/10/2020 Ultrasound guided puncture drainage of lesion. Treatment Plan of Care: ENT following, IV fluids, Maxipime, IV Vancomycin per pharmacy dosing, prn Tylenol, Norco, Glycolax, Phenergan and Zofran, CBC in a.m., general diet, heat to affected area, SCD's, up as tolerated. Barriers to Discharge: Medical stability. PCP: No primary care provider on file. Readmission Risk Score: 5%  Patient Goals/Plan/Treatment Preferences: Home, return to [facility].

## 2020-09-10 NOTE — PLAN OF CARE
Problem: Pain:  Goal: Pain level will decrease  Description: Pain level will decrease  Outcome: Ongoing  Note: Patient reporting pain 7/10 with goal of 4/10. Patient satisfied with current interventions including rest and repositioning. Pain assessments ongoing. Problem: SKIN INTEGRITY  Goal: Skin integrity is maintained or improved  Outcome: Ongoing  Note: No new skin breakdown noted. Patient turns self and encouraged to reposition every 2 hours. Skin assessments ongoing. Patient had abscess drained today. CHG shower taken prior to procedure with complete linen and gown change. Patient on IV antibiotics. Problem: Discharge Planning:  Goal: Discharged to appropriate level of care  Description: Discharged to appropriate level of care  Outcome: Ongoing  Note: Discharge plans to return home discussed with patient. Problem: Falls - Risk of:  Goal: Will remain free from falls  Description: Will remain free from falls  Outcome: Ongoing  Note: Patient free from falls this shift. Patient using call light appropriately. Call light in reach, fall sign posted, nonskid footwear on, hourly rounding, bed alarm on, bed rails up x2. Patient at risk for falls due to generalized weakness. Care plan reviewed with patient. Patient verbalizes understanding of the plan of care and contributes to goal setting.

## 2020-09-10 NOTE — PROGRESS NOTES
Hospitalist Progress Note    Patient:  Caroline Baird    Unit/Bed:5K-11/011-A  YOB: 2000  MRN: [id number]   Acct: [de-identified]   PCP: No primary care provider on file. Code Status: Full Code  Date of Admission: 9/7/2020    Expected Discharge: 2-3 days  Disposition: Home; IR to drain abscess today    Assessment/Plan:    1. Right parotid gland abscess with phlegmon: 5.1x2.0x4.3cm right parotid gland abscess and phlegmon, cervical lymphadenopathy on CT 9/7.   - ENT following. Repeat CT 9/9 showed worsening right sided peritonitis with developing abscess, increased in size from prior. Continue cefepime and vancomycin (started 9/9). Continue sialogogues, heat, massage, oral hygiene and hydration.   - Plan for drainage of abscess per IR 9/10. Follow culture. Chief Complaint: right dental pain     HPI / Hospital Course: Per progress note 9/9: Suresh Davila is a 24 y/o  male without significant past medical history who presented to [facility] on 9/7/20 c/o right jaw pain x1 week. He states that he visited his dentist five days ago and they recommended he have wisdom teeth removed in 12/2020. Pain persisted throughout the week and became unbearable with Ibuprofen/Tylenol regimen at home. He states that he has had decreased PO intake due to the pain associated with chewing. He denies any ear pain, sore throat, vision changes, sour taste in mouth, fever, or chills. No increased salivation. Kelli Broussard He denies any chest pain, SOB, or productive cough. Workup in ED revealed a right parotid gland abscess with phlegmon. He was started on Unasyn in ED and treated with Solumedrol and morphine. ENT was consulted by ED who recommended admission and monitoring overnight. If he feels better in AM goal would be to discharge with plan for outpatient ENT. . Patient admitted under hospitalist service for further evaluation and treatment.      9/8-> Patient evaluated resting in his bed, attempting to eat soup.   He states that the [address] [facility] helps for brief periods of time with the right jaw pain. Reports he is unable to open his mouth any further than he was yesterday. Denies any fever or chills. Denies any ear pain, vision changes, blurry vision, rhinorrhea, or nasal congestion. Denies any sour taste in his mouth. Reports good dental hygiene. \"    9/8: Minimal improvement in pain/swelling/PO tolerance. Unasyn x1 given, pt transitioned to Zosyn. Continuing sialogogues, heat, and massage. 9/9: Pt clinically worsened with pain, swelling, and now difficulty speaking. Per ENT, will repeat CT and switch to Vanc and Cefepime. WBC increased from 8.9 to 12.2. NGTD on blood cx x2. Subjective (past 24 hours): Pt with continued severe swelling, similar to day prior. WBC improved. Plan for abscess drainage today per IR. Pt states he is tolerating liquids but continues to have difficultly with solid food d/t pain. Denies fever/chills, SOB, CP, abd pain, n/v/d.       ROS: Pertinent positives as noted in HPI. All other systems reviewed and negative. Past medical history, family history, social history and allergies reviewed again and is unchanged since admission. Medications:  Reviewed.   Infusion Medications    sodium chloride 100 mL/hr at 09/10/20 0950     Scheduled Medications    cefepime  1 g Intravenous Q8H    vancomycin (VANCOCIN) intermittent dosing (placeholder)   Other RX Placeholder    vancomycin  1,250 mg Intravenous Q8H    sodium chloride flush  10 mL Intravenous 2 times per day     PRN Meds: sodium chloride flush, acetaminophen **OR** acetaminophen, polyethylene glycol, promethazine **OR** ondansetron, HYDROcodone 5 mg - acetaminophen **OR** HYDROcodone 5 mg - acetaminophen    I/O:     Intake/Output Summary (Last 24 hours) at 9/10/2020 1023  Last data filed at 9/10/2020 0454  Gross per 24 hour   Intake 3716.92 ml   Output --   Net 3716.92 ml       Diet:  Diet NPO, After Midnight    Exam:  /79   Pulse 72   Temp 98.1 °F (36.7 °C) (Oral)   Resp 16   Ht 6' 5\" (1.956 m)   Wt 271 lb (122.9 kg)   SpO2 98%   BMI 32.14 kg/m²   General:   Pleasant young male. NAD. HEENT:  normocephalic and atraumatic. No scleral icterus. PERR. Left facial and neck edema. Painful to palpation. Neck: supple. No JVD. No thyromegaly. Lungs: clear to auscultation. No retractions  Cardiac: RRR without murmur. Abdomen: soft. Nontender. Bowel sounds positive. Extremities:  No clubbing, cyanosis, or edema x 4. Vasculature: capillary refill < 3 seconds. Palpable LE pulses bilaterally. Skin:  warm and dry. Psych:  Alert and oriented x3. Affect appropriate  Lymph:  No supraclavicular adenopathy. Neurologic:  No focal deficit. No seizures. Data: (All radiographs, tracings, PFTs, and imaging are personally viewed and interpreted unless otherwise noted)  Labs:   Recent Labs     09/08/20 0725 09/09/20  0740 09/10/20  0641   WBC 8.9 12.2* 10.5   HGB 17.1 14.8 14.5   HCT 50.6 43.3 42.6    248 232     Recent Labs     09/07/20  2005 09/08/20  0725 09/10/20  0641    136 137   K 4.2 4.7 3.8    102 101   CO2 23 23 26   BUN 7 7 9   CREATININE 0.7 0.5 0.6   CALCIUM 9.5 9.9 9.1     Recent Labs     09/07/20 2005   AST 34   ALT 43   BILITOT 0.5   ALKPHOS 134*     No results for input(s): INR in the last 72 hours. No results for input(s): Alber Shown in the last 72 hours.   Urinalysis:   No results found for: NITRU, WBCUA, BACTERIA, RBCUA, BLOODU, SPECGRAV, GLUCOSEU  Urine culture: No results found for: LABURIN  Micro:   Blood culture #1:   Lab Results   Component Value Date    BC No growth-preliminary  09/07/2020    BC No growth-preliminary  09/07/2020     Blood culture #2:No results found for: Maryfrances Bigger  Organism:No results found for: ORG    No results found for: LABGRAM  MRSA culture only:No results found for: [facility]  Respiratory culture: No results found for: CULTRESP  Aerobic and Anaerobic :  No results found for: LABAERO  No results found for: [facility]    Radiology Reports:  CT SOFT TISSUE NECK W CONTRAST   Final Result       1. Worsening right-sided peritonitis with developing right intraparotid abscess which has increased in size in the interval.   2. Bilateral lymphadenopathy is likely reactive. **This report has been created using voice recognition software. It may contain minor errors which are inherent in voice recognition technology. **      Final report electronically signed by Dr. Bo Urbano MD on 9/9/2020 1:55 PM      CT SOFT TISSUE NECK W CONTRAST   Final Result   1. The right parotid gland is enlarged measuring 5.1 x 2.0 x 4.3 cm. There is mixed attenuation throughout the enlarged right parotid gland suggesting a combination of abscess formation and phlegmon. There are stranding densities consistent with    inflammation within the adjacent subcutaneous tissues. There are numerous cervical lymph nodes which most likely are reactive. There is no associated osseous or vascular abnormality. Appropriate clinical management is recommended. **This report has been created using voice recognition software. It may contain minor errors which are inherent in voice recognition technology. **      Final report electronically signed by Dr. Aury Webb on 9/7/2020 9:06 PM      [address] [place] [address]    (Results Pending)     Ct Soft Tissue Neck W Contrast    Result Date: 9/7/2020  PROCEDURE: CT SOFT TISSUE NECK W CONTRAST CLINICAL INFORMATION: Right mandibular pain and swelling COMPARISON: No prior study. TECHNIQUE: 3 mm axial imaging through the neck with intravenous contrast from the base of the brain to superior aspect of the heart. All CT scans at this facility use dose modulation, iterative reconstruction, and/or weight based dosing when appropriate to reduce the radiation dose to as low as reasonably achievable. CONTRAST: 80 mL Isovue-370 intravenously.  FINDINGS: PHARYNX/ORAL CAVITY/TONSILS: Unremarkable. EPIGLOTTIS: Unremarkable. VALLECULA/PIRIFORM SINUSES: Unremarkable. LARYNX: Unremarkable. CERVICAL LYMPHADENOPATHY: 1. There are no pathologically enlarged lymph nodes. SALIVARY GLANDS/THYROID GLAND: 1. The right parotid gland is enlarged measuring 5.1 x 2.0 x 4.3 cm. There is mixed attenuation throughout the enlarged right parotid gland suggesting a combination of abscess formation and phlegmon. There are stranding densities consistent with inflammation within the adjacent subcutaneous tissues. There are numerous cervical lymph nodes which most likely are reactive. There is no associated osseous or vascular abnormality. 2. The left parotid gland is normal. 3. The thyroid gland is normal. INFLAMMATION: 1. As previously described. VASCULATURE: Unremarkable. LUNG APICES: Unremarkable. OSSEOUS STRUCTURES: Unremarkable. PARANASAL SINUSES: Unremarkable. BRAIN: Unremarkable. OTHER: None. 1. The right parotid gland is enlarged measuring 5.1 x 2.0 x 4.3 cm. There is mixed attenuation throughout the enlarged right parotid gland suggesting a combination of abscess formation and phlegmon. There are stranding densities consistent with inflammation within the adjacent subcutaneous tissues. There are numerous cervical lymph nodes which most likely are reactive. There is no associated osseous or vascular abnormality. Appropriate clinical management is recommended. **This report has been created using voice recognition software. It may contain minor errors which are inherent in voice recognition technology. ** Final report electronically signed by Dr. Ryan Police on 9/7/2020 9:06 PM        Tele:   [] yes             [x] no      Active Hospital Problems    Diagnosis Date Noted    Abscess of parotid gland [K11.3] 09/07/2020       Electronically signed by Cloria Dance, PA-C on 9/10/2020 at 10:23 AM

## 2020-09-10 NOTE — PROGRESS NOTES
(NORCO) 5-325 MG per tablet 1 tablet, 1 tablet, Oral, Q4H PRN **OR** HYDROcodone-acetaminophen (NORCO) 5-325 MG per tablet 2 tablet, 2 tablet, Oral, Q4H PRN    ASSESSMENT AND PLAN    Acute parotitis, right  Parotid abscess, right    -Continue current antibiotic regimen of IV vancomycin and cefepime.  -May modify antibiotics pending culture results of aspirated parotid material  -Repeat CBC in a.m. Imperative to resolution of parotitis (these are not optional/prn):            -Continue oral hydration and hygiene. - Apply moist heat for 15 minutes followed by gentle massage (over parotid gland and towards front of mouth, this is to milk the gland. The duct from the gland empties in the mouth across from the top molars). Do this every 4 hours while awake. - Use sialogogues (increases saliva production)- lemon wedges, sour candies (warheads, lemon drops, etc) every 3-4 hours while awake.     Electronically signed by RUTH ANN Kahn on 9/10/2020 at 3:25 PM

## 2020-09-11 LAB
ERYTHROCYTE [DISTWIDTH] IN BLOOD BY AUTOMATED COUNT: 12.4 % (ref 11.5–14.5)
ERYTHROCYTE [DISTWIDTH] IN BLOOD BY AUTOMATED COUNT: 40.6 FL (ref 35–45)
HCT VFR BLD CALC: 44.1 % (ref 42–52)
HEMOGLOBIN: 15 GM/DL (ref 14–18)
MCH RBC QN AUTO: 30.3 PG (ref 26–33)
MCHC RBC AUTO-ENTMCNC: 34 GM/DL (ref 32.2–35.5)
MCV RBC AUTO: 89.1 FL (ref 80–94)
PLATELET # BLD: 232 THOU/MM3 (ref 130–400)
PMV BLD AUTO: 8.8 FL (ref 9.4–12.4)
RBC # BLD: 4.95 MILL/MM3 (ref 4.7–6.1)
VANCOMYCIN TROUGH: 10.9 UG/ML (ref 5–15)
WBC # BLD: 7.5 THOU/MM3 (ref 4.8–10.8)

## 2020-09-11 PROCEDURE — 2580000003 HC RX 258: Performed by: OTOLARYNGOLOGY

## 2020-09-11 PROCEDURE — 99232 SBSQ HOSP IP/OBS MODERATE 35: CPT | Performed by: PHYSICIAN ASSISTANT

## 2020-09-11 PROCEDURE — 6360000002 HC RX W HCPCS: Performed by: OTOLARYNGOLOGY

## 2020-09-11 PROCEDURE — 1200000000 HC SEMI PRIVATE

## 2020-09-11 PROCEDURE — 2580000003 HC RX 258: Performed by: PHYSICIAN ASSISTANT

## 2020-09-11 PROCEDURE — 36415 COLL VENOUS BLD VENIPUNCTURE: CPT

## 2020-09-11 PROCEDURE — 6370000000 HC RX 637 (ALT 250 FOR IP): Performed by: PHYSICIAN ASSISTANT

## 2020-09-11 PROCEDURE — 6360000002 HC RX W HCPCS: Performed by: PHYSICIAN ASSISTANT

## 2020-09-11 PROCEDURE — 80202 ASSAY OF VANCOMYCIN: CPT

## 2020-09-11 PROCEDURE — 94760 N-INVAS EAR/PLS OXIMETRY 1: CPT

## 2020-09-11 PROCEDURE — 85027 COMPLETE CBC AUTOMATED: CPT

## 2020-09-11 RX ADMIN — VANCOMYCIN HYDROCHLORIDE 1250 MG: 5 INJECTION, POWDER, LYOPHILIZED, FOR SOLUTION INTRAVENOUS at 04:52

## 2020-09-11 RX ADMIN — CEFEPIME HYDROCHLORIDE 1 G: 1 INJECTION, POWDER, FOR SOLUTION INTRAMUSCULAR; INTRAVENOUS at 14:33

## 2020-09-11 RX ADMIN — CEFEPIME HYDROCHLORIDE 1 G: 1 INJECTION, POWDER, FOR SOLUTION INTRAMUSCULAR; INTRAVENOUS at 07:53

## 2020-09-11 RX ADMIN — VANCOMYCIN HYDROCHLORIDE 1250 MG: 5 INJECTION, POWDER, LYOPHILIZED, FOR SOLUTION INTRAVENOUS at 21:22

## 2020-09-11 RX ADMIN — VANCOMYCIN HYDROCHLORIDE 1250 MG: 5 INJECTION, POWDER, LYOPHILIZED, FOR SOLUTION INTRAVENOUS at 12:29

## 2020-09-11 RX ADMIN — HYDROCODONE BITARTRATE AND ACETAMINOPHEN 2 TABLET: 5; 325 TABLET ORAL at 04:52

## 2020-09-11 RX ADMIN — SODIUM CHLORIDE: 9 INJECTION, SOLUTION INTRAVENOUS at 14:33

## 2020-09-11 RX ADMIN — CEFEPIME HYDROCHLORIDE 1 G: 1 INJECTION, POWDER, FOR SOLUTION INTRAMUSCULAR; INTRAVENOUS at 23:12

## 2020-09-11 ASSESSMENT — PAIN SCALES - GENERAL
PAINLEVEL_OUTOF10: 3
PAINLEVEL_OUTOF10: 6
PAINLEVEL_OUTOF10: 6
PAINLEVEL_OUTOF10: 0

## 2020-09-11 ASSESSMENT — PAIN DESCRIPTION - DESCRIPTORS: DESCRIPTORS: ACHING;DISCOMFORT

## 2020-09-11 ASSESSMENT — PAIN DESCRIPTION - ORIENTATION: ORIENTATION: RIGHT

## 2020-09-11 ASSESSMENT — PAIN DESCRIPTION - PROGRESSION: CLINICAL_PROGRESSION: NOT CHANGED

## 2020-09-11 ASSESSMENT — PAIN DESCRIPTION - FREQUENCY: FREQUENCY: CONTINUOUS

## 2020-09-11 ASSESSMENT — PAIN DESCRIPTION - PAIN TYPE: TYPE: ACUTE PAIN

## 2020-09-11 ASSESSMENT — PAIN DESCRIPTION - DIRECTION: RADIATING_TOWARDS: CHEEK

## 2020-09-11 ASSESSMENT — PAIN DESCRIPTION - ONSET: ONSET: ON-GOING

## 2020-09-11 ASSESSMENT — PAIN - FUNCTIONAL ASSESSMENT: PAIN_FUNCTIONAL_ASSESSMENT: ACTIVITIES ARE NOT PREVENTED

## 2020-09-11 ASSESSMENT — PAIN DESCRIPTION - LOCATION: LOCATION: FACE

## 2020-09-11 NOTE — PLAN OF CARE
Problem: Pain:  Goal: Pain level will decrease  Description: Pain level will decrease  Outcome: Ongoing  Note: Pain Assessment: 0-10  Pain Level: 5   Patient's Stated Pain Goal: 4   Is pain goal met at this time? Yes     Non-Pharmaceutical Pain Intervention(s): Repositioned, Rest, Heat applied  PRN pain medication being given. Problem: SAFETY  Goal: Free from accidental physical injury  Outcome: Ongoing  Note: Patient is free if accidental physical injury this shift. Bed in lowest position. Call light and personal items within reach. Bed alarm on. Problem: SKIN INTEGRITY  Goal: Skin integrity is maintained or improved  Outcome: Ongoing  Note: Patients right side of face is swollen. Warm compress applied. I&D procedure done yesterday. Problem: Discharge Planning:  Goal: Discharged to appropriate level of care  Description: Discharged to appropriate level of care  Outcome: Ongoing  Note: Patient plans to return home at discharge. Care plan reviewed with patient. Patient verbalize understanding of the plan of care and contribute to goal setting.

## 2020-09-11 NOTE — PROGRESS NOTES
Pharmacy Vancomycin Consult     Vancomycin Day: 3  Current Dosing: Vancomycin 1250 mg IVPB Q8H    Temp max:  98    Recent Labs     09/08/20  0725 09/10/20  0641   BUN 7 9       Recent Labs     09/08/20  0725 09/10/20  0641   CREATININE 0.5 0.6       Recent Labs     09/10/20  0641 09/11/20  0435   WBC 10.5 7.5         Intake/Output Summary (Last 24 hours) at 9/11/2020 0721  Last data filed at 9/10/2020 2352  Gross per 24 hour   Intake 5149.76 ml   Output --   Net 5149.76 ml       Cultures/Sensitivites  Date Source Result   9/7/2020 BC1 ngtd   9/7/2020 BC2 ngtd   9/10/2020 Parotid abscess Many gram positive cocci in pairs and chains       Ht Readings from Last 1 Encounters:   09/07/20 6' 5\" (1.956 m)        Wt Readings from Last 1 Encounters:   09/07/20 271 lb (122.9 kg)         Body mass index is 32.14 kg/m². Estimated Creatinine Clearance: 280 mL/min (based on SCr of 0.6 mg/dL). Trough: 10.9 mcg/mL     Assessment/Plan:  Scr stable. Trough today was at goal of 10-15 mcg/mL for parotitis (10.9 mcg/mL). No changes today. Continue Vancomycin 1250 mg IVPB Q8H at this time. Next trough will be ordered based on clinical outlook of patient. Will continue to follow.     Thanks for the Consult,    Cara RazaD, BCPS  9/11/2020  7:24 AM

## 2020-09-11 NOTE — PROGRESS NOTES
Hospitalist Progress Note    Patient:  Lior Clayton    Unit/Bed:5K-11/011-A  YOB: 2000  MRN: [id number]   Acct: [de-identified]   PCP: No primary care provider on file. Code Status: Full Code  Date of Admission: 9/7/2020    Expected Discharge: 9/12? Disposition: Home    Assessment/Plan:    1. Right parotid gland abscess with phlegmon: 5.1x2.0x4.3cm right parotid gland abscess and phlegmon, cervical lymphadenopathy on CT 9/7.   - ENT following. Repeat CT 9/9 showed worsening right sided peritonitis with developing abscess, increased in size from prior. Continue cefepime and vancomycin (started 9/9). Continue sialogogues, heat, massage, oral hygiene and hydration.   - S/p drainage of abscess per IR 9/10. Culture is negative preliminary; however, pt has been on abx prior to it being drawn. Continue current abx. Chief Complaint: right dental pain     HPI / Hospital Course: Per progress note 9/9: Tiana Guzman is a 24 y/o  male without significant past medical history who presented to [facility] on 9/7/20 c/o right jaw pain x1 week. He states that he visited his dentist five days ago and they recommended he have wisdom teeth removed in 12/2020. Pain persisted throughout the week and became unbearable with Ibuprofen/Tylenol regimen at home. He states that he has had decreased PO intake due to the pain associated with chewing. He denies any ear pain, sore throat, vision changes, sour taste in mouth, fever, or chills. No increased salivation. Alicia Staggers He denies any chest pain, SOB, or productive cough. Workup in ED revealed a right parotid gland abscess with phlegmon. He was started on Unasyn in ED and treated with Solumedrol and morphine. ENT was consulted by ED who recommended admission and monitoring overnight. If he feels better in AM goal would be to discharge with plan for outpatient ENT. . Patient admitted under hospitalist service for further evaluation and treatment.      9/8-> Patient evaluated resting in his bed, attempting to eat soup. He states that the Norco helps for brief periods of time with the right jaw pain. Reports he is unable to open his mouth any further than he was yesterday. Denies any fever or chills. Denies any ear pain, vision changes, blurry vision, rhinorrhea, or nasal congestion. Denies any sour taste in his mouth. Reports good dental hygiene. \"    9/8: Minimal improvement in pain/swelling/PO tolerance. Unasyn x1 given, pt transitioned to Zosyn. Continuing sialogogues, heat, and massage. 9/9: Pt clinically worsened with pain, swelling, and now difficulty speaking. Per ENT, will repeat CT and switch to Vanc and Cefepime. WBC increased from 8.9 to 12.2. NGTD on blood cx x2.     9/10: Pt with continued severe swelling, similar to day prior. WBC improved. Plan for abscess drainage today per IR. Subjective (past 24 hours):  Slight improvement in swelling. Pt with continued pain and difficultly opening mouth or eating solid foods. He denies fever/chills, SOB, CP, abd pain, n/v/d.       ROS: Pertinent positives as noted in HPI. All other systems reviewed and negative. Past medical history, family history, social history and allergies reviewed again and is unchanged since admission. Medications:  Reviewed.   Infusion Medications    sodium chloride 100 mL/hr at 09/10/20 6509     Scheduled Medications    cefepime  1 g Intravenous Q8H    vancomycin (VANCOCIN) intermittent dosing (placeholder)   Other RX Placeholder    vancomycin  1,250 mg Intravenous Q8H    sodium chloride flush  10 mL Intravenous 2 times per day     PRN Meds: sodium chloride flush, acetaminophen **OR** acetaminophen, polyethylene glycol, promethazine **OR** ondansetron, HYDROcodone 5 mg - acetaminophen **OR** HYDROcodone 5 mg - acetaminophen    I/O:     Intake/Output Summary (Last 24 hours) at 9/11/2020 1304  Last data filed at 9/10/2020 2352  Gross per 24 hour   Intake 5149.76 ml   Output -- Net 5149.76 ml       Diet:  DIET GENERAL;    Exam:  /66   Pulse 70   Temp 97.9 °F (36.6 °C) (Oral)   Resp 16   Ht 6' 5\" (1.956 m)   Wt 271 lb (122.9 kg)   SpO2 96%   BMI 32.14 kg/m²   General:   Pleasant young male. NAD. HEENT:  normocephalic and atraumatic. No scleral icterus. PERR. Left facial and neck edema. Painful to palpation. Neck: supple. No JVD. No thyromegaly. Lungs: clear to auscultation. No retractions  Cardiac: RRR without murmur. Abdomen: soft. Nontender. Bowel sounds positive. Extremities:  No clubbing, cyanosis, or edema x 4. Vasculature: capillary refill < 3 seconds. Palpable LE pulses bilaterally. Skin:  warm and dry. Psych:  Alert and oriented x3. Affect appropriate  Lymph:  No supraclavicular adenopathy. Neurologic:  No focal deficit. No seizures. Data: (All radiographs, tracings, PFTs, and imaging are personally viewed and interpreted unless otherwise noted)  Labs:   Recent Labs     09/09/20  0740 09/10/20  0641 09/11/20  0435   WBC 12.2* 10.5 7.5   HGB 14.8 14.5 15.0   HCT 43.3 42.6 44.1    232 232     Recent Labs     09/10/20  0641      K 3.8      CO2 26   BUN 9   CREATININE 0.6   CALCIUM 9.1     No results for input(s): AST, ALT, BILIDIR, BILITOT, ALKPHOS in the last 72 hours. No results for input(s): INR in the last 72 hours. No results for input(s): Parisa Formosa in the last 72 hours. Urinalysis:   No results found for: NITRU, WBCUA, BACTERIA, RBCUA, BLOODU, SPECGRAV, GLUCOSEU  Urine culture: No results found for: LABURIN  Micro:   Blood culture #1:   Lab Results   Component Value Date    BC No growth-preliminary  09/07/2020    BC No growth-preliminary  09/07/2020     Blood culture #2:No results found for: [facility]  Organism:No results found for: [facility]      Lab Results   Component Value Date    LABGRAM  09/10/2020     Many segmented neutrophils observed. No epithelial cells observed. Many gram positive cocci in pairs and chains. MRSA culture only:No results found for: [facility]  Respiratory culture: No results found for: CULTRESP  Aerobic and Anaerobic :  Lab Results   Component Value Date    LABAERO No growth-preliminary  09/10/2020     No results found for: [facility]    Radiology Reports:  CT SOFT TISSUE NECK W CONTRAST   Final Result       1. Worsening right-sided peritonitis with developing right intraparotid abscess which has increased in size in the interval.   2. Bilateral lymphadenopathy is likely reactive. **This report has been created using voice recognition software. It may contain minor errors which are inherent in voice recognition technology. **      Final report electronically signed by Dr. Michelet Carmichael MD on 9/9/2020 1:55 PM      CT SOFT TISSUE NECK W CONTRAST   Final Result   1. The right parotid gland is enlarged measuring 5.1 x 2.0 x 4.3 cm. There is mixed attenuation throughout the enlarged right parotid gland suggesting a combination of abscess formation and phlegmon. There are stranding densities consistent with    inflammation within the adjacent subcutaneous tissues. There are numerous cervical lymph nodes which most likely are reactive. There is no associated osseous or vascular abnormality. Appropriate clinical management is recommended. **This report has been created using voice recognition software. It may contain minor errors which are inherent in voice recognition technology. **      Final report electronically signed by Dr. Nazanin Agosto on 9/7/2020 9:06 PM      US PUNCTURE DRAINAGE OF LESION ABCESS HEMATOMA BULLA CYST    (Results Pending)     Ct Soft Tissue Neck W Contrast    Result Date: 9/7/2020  PROCEDURE: CT SOFT TISSUE NECK W CONTRAST CLINICAL INFORMATION: Right mandibular pain and swelling COMPARISON: No prior study. TECHNIQUE: 3 mm axial imaging through the neck with intravenous contrast from the base of the brain to superior aspect of the heart.  All CT scans at this facility use dose modulation, iterative reconstruction, and/or weight based dosing when appropriate to reduce the radiation dose to as low as reasonably achievable. CONTRAST: 80 mL Isovue-370 intravenously. FINDINGS: PHARYNX/ORAL CAVITY/TONSILS: Unremarkable. EPIGLOTTIS: Unremarkable. VALLECULA/PIRIFORM SINUSES: Unremarkable. LARYNX: Unremarkable. CERVICAL LYMPHADENOPATHY: 1. There are no pathologically enlarged lymph nodes. SALIVARY GLANDS/THYROID GLAND: 1. The right parotid gland is enlarged measuring 5.1 x 2.0 x 4.3 cm. There is mixed attenuation throughout the enlarged right parotid gland suggesting a combination of abscess formation and phlegmon. There are stranding densities consistent with inflammation within the adjacent subcutaneous tissues. There are numerous cervical lymph nodes which most likely are reactive. There is no associated osseous or vascular abnormality. 2. The left parotid gland is normal. 3. The thyroid gland is normal. INFLAMMATION: 1. As previously described. VASCULATURE: Unremarkable. LUNG APICES: Unremarkable. OSSEOUS STRUCTURES: Unremarkable. PARANASAL SINUSES: Unremarkable. BRAIN: Unremarkable. OTHER: None. 1. The right parotid gland is enlarged measuring 5.1 x 2.0 x 4.3 cm. There is mixed attenuation throughout the enlarged right parotid gland suggesting a combination of abscess formation and phlegmon. There are stranding densities consistent with inflammation within the adjacent subcutaneous tissues. There are numerous cervical lymph nodes which most likely are reactive. There is no associated osseous or vascular abnormality. Appropriate clinical management is recommended. **This report has been created using voice recognition software. It may contain minor errors which are inherent in voice recognition technology. ** Final report electronically signed by Dr. Cj Harris on 9/7/2020 9:06 PM        Tele:   [] yes             [x] no      Active Hospital Problems    Diagnosis Date Noted    Abscess of parotid gland [K11.3] 09/07/2020       Electronically signed by Nadeen Sandoval PA-C on 9/11/2020 at 1:04 PM

## 2020-09-11 NOTE — PLAN OF CARE
Problem: Pain:  Goal: Pain level will decrease  Description: Pain level will decrease  Outcome: Ongoing  Note: Patient reporting pain 3/10 with goal of 4/10. Patient satisfied with current interventions including rest and repositioning. Pain assessments ongoing. Problem: Discharge Planning:  Goal: Discharged to appropriate level of care  Description: Discharged to appropriate level of care  Outcome: Ongoing  Note: Discharge plans to return home discussed with patient. Problem: Falls - Risk of:  Goal: Will remain free from falls  Description: Will remain free from falls  Outcome: Ongoing  Note: Patient free from falls this shift. Patient using call light appropriately. Call light in reach, fall sign posted, nonskid footwear on, hourly rounding, bed alarm on, bed rails up x2. Patient at risk for falls due to generalized weakness. Problem: Skin Integrity:  Goal: Demonstration of wound healing without infection will improve  Description: Demonstration of wound healing without infection will improve  Outcome: Ongoing  Note: Patient continued on IV antibiotics. Warm compresses and heat pad used per ENT instructions with massage following. WBC 7.5 down from 10.5. Care plan reviewed with patient . Patient verbalizes understanding of the plan of care and contributes to goal setting.

## 2020-09-11 NOTE — PROGRESS NOTES
still.  Bilateral angles of mandible are palpable. The patient's trismus persist but appears stable. Neck: Trachea midline. Thyroid not enlarged, no palpable masses or tenderness. Lymphatic: No cervical lymphadenopathy palpable at this time. Eyes: ROXANA, EOM intact. Conjunctiva moist without discharge. Lungs: Normal effort of breathing, not obviously distressed. Neuro: Cranial nerves II-XII grossly intact. Extremities: No clubbing, edema, or cyanosis noted. Data  CBC:   Lab Results   Component Value Date    WBC 7.5 09/11/2020    RBC 4.95 09/11/2020    HGB 15.0 09/11/2020    HCT 44.1 09/11/2020    MCV 89.1 09/11/2020    MCH 30.3 09/11/2020    MCHC 34.0 09/11/2020     09/11/2020    MPV 8.8 09/11/2020   WBC decreased in interval again. Down to 7.5 from 10.5 yesterday. Gram stain from aspiration of parotid abscess 9/10/20  Gram Stain Result  09/10/2020 11:21 AM  [address] [facility]    Many segmented neutrophils observed. No epithelial cells observed. Many gram positive cocci in pairs and chains.       Inpatient Medications  Current Facility-Administered Medications: cefepime (MAXIPIME) 1 g IVPB minibag, 1 g, Intravenous, Q8H  vancomycin (VANCOCIN) intermittent dosing (placeholder), , Other, RX Placeholder  vancomycin (VANCOCIN) 1,250 mg in dextrose 5 % 250 mL IVPB, 1,250 mg, Intravenous, Q8H  sodium chloride flush 0.9 % injection 10 mL, 10 mL, Intravenous, 2 times per day  sodium chloride flush 0.9 % injection 10 mL, 10 mL, Intravenous, PRN  acetaminophen (TYLENOL) tablet 650 mg, 650 mg, Oral, Q6H PRN **OR** acetaminophen (TYLENOL) suppository 650 mg, 650 mg, Rectal, Q6H PRN  polyethylene glycol (GLYCOLAX) packet 17 g, 17 g, Oral, Daily PRN  promethazine (PHENERGAN) tablet 12.5 mg, 12.5 mg, Oral, Q6H PRN **OR** ondansetron (ZOFRAN) injection 4 mg, 4 mg, Intravenous, Q6H PRN  0.9 % sodium chloride infusion, , Intravenous, Continuous  HYDROcodone-acetaminophen (NORCO) 5-325 MG per tablet 1 tablet, 1 tablet, Oral, Q4H PRN **OR** HYDROcodone-acetaminophen (NORCO) 5-325 MG per tablet 2 tablet, 2 tablet, Oral, Q4H PRN    ASSESSMENT AND PLAN    Acute parotitis, right  Parotid abscess, right    -Continue current antibiotic regimen of IV vancomycin and cefepime  -We will continue to monitor culture results and modify antibiotics if necessary  -Repeat CBC in a.m.  -If patient's CBC and sypmtoms continue to improve over the next 24-48 hours, will plan to transition to oral antibiotics. He should stay inpatient for around 24 hours after oral antibiotics to ensure the infection will not acutely worsen. Imperative to resolution of parotitis (these are not optional/prn):            -Continue oral hydration and hygiene. - Apply heating pad for 15 minutes followed by gentle massage (over parotid gland and towards front of mouth, this is to milk the gland. The duct from the gland empties in the mouth across from the top molars). Do this every 4 hours while awake. He may use the heating pad PRN between using routinely every 4 hours. - Use sialogogues (increases saliva production)- lemon wedges, sour candies (warheads, lemon drops, etc) every 3-4 hours while awake.     Electronically signed by RUTH ANN Pan on 9/11/2020 at 11:26 AM

## 2020-09-12 LAB
ERYTHROCYTE [DISTWIDTH] IN BLOOD BY AUTOMATED COUNT: 12.3 % (ref 11.5–14.5)
ERYTHROCYTE [DISTWIDTH] IN BLOOD BY AUTOMATED COUNT: 40.2 FL (ref 35–45)
HCT VFR BLD CALC: 44.5 % (ref 42–52)
HEMOGLOBIN: 14.9 GM/DL (ref 14–18)
MCH RBC QN AUTO: 30 PG (ref 26–33)
MCHC RBC AUTO-ENTMCNC: 33.5 GM/DL (ref 32.2–35.5)
MCV RBC AUTO: 89.7 FL (ref 80–94)
PLATELET # BLD: 262 THOU/MM3 (ref 130–400)
PMV BLD AUTO: 9 FL (ref 9.4–12.4)
RBC # BLD: 4.96 MILL/MM3 (ref 4.7–6.1)
WBC # BLD: 5.2 THOU/MM3 (ref 4.8–10.8)

## 2020-09-12 PROCEDURE — 36415 COLL VENOUS BLD VENIPUNCTURE: CPT

## 2020-09-12 PROCEDURE — 2580000003 HC RX 258: Performed by: PHYSICIAN ASSISTANT

## 2020-09-12 PROCEDURE — 6360000002 HC RX W HCPCS: Performed by: PHYSICIAN ASSISTANT

## 2020-09-12 PROCEDURE — 94760 N-INVAS EAR/PLS OXIMETRY 1: CPT

## 2020-09-12 PROCEDURE — 85027 COMPLETE CBC AUTOMATED: CPT

## 2020-09-12 PROCEDURE — 6360000002 HC RX W HCPCS: Performed by: OTOLARYNGOLOGY

## 2020-09-12 PROCEDURE — 1200000000 HC SEMI PRIVATE

## 2020-09-12 PROCEDURE — 99231 SBSQ HOSP IP/OBS SF/LOW 25: CPT | Performed by: PHYSICIAN ASSISTANT

## 2020-09-12 PROCEDURE — 2580000003 HC RX 258: Performed by: OTOLARYNGOLOGY

## 2020-09-12 RX ADMIN — SODIUM CHLORIDE: 9 INJECTION, SOLUTION INTRAVENOUS at 04:41

## 2020-09-12 RX ADMIN — VANCOMYCIN HYDROCHLORIDE 1250 MG: 5 INJECTION, POWDER, LYOPHILIZED, FOR SOLUTION INTRAVENOUS at 04:41

## 2020-09-12 RX ADMIN — CEFEPIME HYDROCHLORIDE 1 G: 1 INJECTION, POWDER, FOR SOLUTION INTRAMUSCULAR; INTRAVENOUS at 06:00

## 2020-09-12 RX ADMIN — CEFEPIME HYDROCHLORIDE 1 G: 1 INJECTION, POWDER, FOR SOLUTION INTRAMUSCULAR; INTRAVENOUS at 15:33

## 2020-09-12 RX ADMIN — VANCOMYCIN HYDROCHLORIDE 1250 MG: 5 INJECTION, POWDER, LYOPHILIZED, FOR SOLUTION INTRAVENOUS at 15:33

## 2020-09-12 RX ADMIN — VANCOMYCIN HYDROCHLORIDE 1250 MG: 5 INJECTION, POWDER, LYOPHILIZED, FOR SOLUTION INTRAVENOUS at 20:27

## 2020-09-12 RX ADMIN — CEFEPIME HYDROCHLORIDE 1 G: 1 INJECTION, POWDER, FOR SOLUTION INTRAMUSCULAR; INTRAVENOUS at 21:54

## 2020-09-12 ASSESSMENT — PAIN SCALES - GENERAL
PAINLEVEL_OUTOF10: 0
PAINLEVEL_OUTOF10: 0

## 2020-09-12 NOTE — PLAN OF CARE
Problem: Pain:  Goal: Pain level will decrease  Description: Pain level will decrease  9/12/2020 0031 by Jn Morrow RN  Outcome: Ongoing  Note: Pain Assessment: 0-10  Pain Level: 0   Patient's Stated Pain Goal: 4   Is pain goal met at this time? Yes     Non-Pharmaceutical Pain Intervention(s): Heat applied(heating pad followed by massage)       Problem: SKIN INTEGRITY  Goal: Skin integrity is maintained or improved  Outcome: Ongoing  Note: No new skin issues noted this shift. Patient turns independently. Problem: Discharge Planning:  Goal: Discharged to appropriate level of care  Description: Discharged to appropriate level of care  9/12/2020 0031 by Jn Morrow RN  Outcome: Ongoing  Note: Plan to discharge home. No discharge needs. Problem: Falls - Risk of:  Goal: Will remain free from falls  Description: Will remain free from falls  9/12/2020 0031 by Jn Morrow RN  Outcome: Ongoing  Note: Patient remains free from falls this shift. Fall risk assessment complete. Fall sign posted. Non skid socks on. Bed in lowest position, 2/4 siderails up. Bed alarm on. Call light and all possessions within reach. Ambulates independently. Rounding hourly       Problem: Skin Integrity:  Goal: Demonstration of wound healing without infection will improve  Description: Demonstration of wound healing without infection will improve  9/12/2020 0031 by Jn Morrow RN  Outcome: Ongoing  Note: Bandaid over incision on right face. Minimal drainage. Heat applied. No pain. Care plan reviewed with patient. Patient verbalize understanding of the plan of care and contribute to goal setting.

## 2020-09-12 NOTE — PROGRESS NOTES
Hospitalist Progress Note    Patient:  Vel Bucio    Unit/Bed:5K-11/011-A  YOB: 2000  MRN: [id number]   Acct: [de-identified]   PCP: No primary care provider on file. Code Status: Full Code  Date of Admission: 9/7/2020    Expected Discharge: 9/12? Disposition: Home    Assessment/Plan:    1. Right parotid gland abscess with phlegmon: 5.1x2.0x4.3cm right parotid gland abscess and phlegmon, cervical lymphadenopathy on CT 9/7.   - ENT following. Repeat CT 9/9 showed worsening right sided peritonitis with developing abscess, increased in size from prior. Continue cefepime and vancomycin (started 9/9). Continue sialogogues, heat, massage, oral hygiene and hydration.   - S/p drainage of abscess per IR 9/10. Culture is negative preliminary; however, pt has been on abx prior to it being drawn.  - 9/12: Spoke with ENT who recommended pt stay on IV Abx for another day or two. Chief Complaint: right dental pain     HPI / Hospital Course: Per progress note 9/9: Александр Ackerman is a 24 y/o  male without significant past medical history who presented to [facility] c/o right jaw pain x1 week. He states that he visited his dentist five days ago and they recommended he have wisdom teeth removed in 12/2020. Pain persisted throughout the week and became unbearable with Ibuprofen/Tylenol regimen at home. He states that he has had decreased PO intake due to the pain associated with chewing. He denies any ear pain, sore throat, vision changes, sour taste in mouth, fever, or chills. No increased salivation. Hetal Simpler He denies any chest pain, SOB, or productive cough. Workup in ED revealed a right parotid gland abscess with phlegmon. He was started on Unasyn in ED and treated with Solumedrol and morphine. ENT was consulted by ED who recommended admission and monitoring overnight. If he feels better in AM goal would be to discharge with plan for outpatient ENT. . Patient admitted under hospitalist service for further evaluation and treatment.      9/8-> Patient evaluated resting in his bed, attempting to eat soup. He states that the Norco helps for brief periods of time with the right jaw pain. Reports he is unable to open his mouth any further than he was yesterday. Denies any fever or chills. Denies any ear pain, vision changes, blurry vision, rhinorrhea, or nasal congestion. Denies any sour taste in his mouth. Reports good dental hygiene. \"    9/8: Minimal improvement in pain/swelling/PO tolerance. Unasyn x1 given, pt transitioned to Zosyn. Continuing sialogogues, heat, and massage. 9/9: Pt clinically worsened with pain, swelling, and now difficulty speaking. Per ENT, will repeat CT and switch to Vanc and Cefepime. WBC increased from 8.9 to 12.2. NGTD on blood cx x2.     9/10: Pt with continued severe swelling, similar to day prior. WBC improved. Plan for abscess drainage today per IR. Subjective (past 24 hours):  Pt reports continued swelling and pain opening mouth. Denies fever/chills, no airway compromise. ROS: Pertinent positives as noted in HPI. All other systems reviewed and negative. Past medical history, family history, social history and allergies reviewed again and is unchanged since admission. Medications:  Reviewed.   Infusion Medications    sodium chloride 100 mL/hr at 09/12/20 0441     Scheduled Medications    cefepime  1 g Intravenous Q8H    vancomycin (VANCOCIN) intermittent dosing (placeholder)   Other RX Placeholder    vancomycin  1,250 mg Intravenous Q8H    sodium chloride flush  10 mL Intravenous 2 times per day     PRN Meds: sodium chloride flush, acetaminophen **OR** acetaminophen, polyethylene glycol, promethazine **OR** ondansetron, HYDROcodone 5 mg - acetaminophen **OR** HYDROcodone 5 mg - acetaminophen    I/O:     Intake/Output Summary (Last 24 hours) at 9/12/2020 1456  Last data filed at 9/12/2020 0421  Gross per 24 hour   Intake 6075.76 ml   Output 0 ml   Net 6075.76 ml       Diet:  DIET GENERAL;    Exam:  /69   Pulse 59   Temp 97.6 °F (36.4 °C) (Oral)   Resp 18   Ht 6' 5\" (1.956 m)   Wt 271 lb (122.9 kg)   SpO2 98%   BMI 32.14 kg/m²   General:   Pleasant young male. NAD. HEENT:  normocephalic and atraumatic. No scleral icterus. PERR. Left facial and neck edema. Painful to palpation. Neck: supple. No JVD. No thyromegaly. Lungs: clear to auscultation. No retractions  Cardiac: RRR without murmur. Abdomen: soft. Nontender. Bowel sounds positive. Extremities:  No clubbing, cyanosis, or edema x 4. Vasculature: capillary refill < 3 seconds. Palpable LE pulses bilaterally. Skin:  warm and dry. Psych:  Alert and oriented x3. Affect appropriate  Lymph:  No supraclavicular adenopathy. Neurologic:  No focal deficit. No seizures. Data: (All radiographs, tracings, PFTs, and imaging are personally viewed and interpreted unless otherwise noted)  Labs:   Recent Labs     09/10/20  0641 09/11/20  0435 09/12/20  0621   WBC 10.5 7.5 5.2   HGB 14.5 15.0 14.9   HCT 42.6 44.1 44.5    232 262     Recent Labs     09/10/20  0641      K 3.8      CO2 26   BUN 9   CREATININE 0.6   CALCIUM 9.1     No results for input(s): AST, ALT, BILIDIR, BILITOT, ALKPHOS in the last 72 hours. No results for input(s): INR in the last 72 hours. No results for input(s): Norvel Sylwia in the last 72 hours. Urinalysis:   No results found for: NITRU, WBCUA, BACTERIA, RBCUA, BLOODU, SPECGRAV, GLUCOSEU  Urine culture: No results found for: LABURIN  Micro:   Blood culture #1:   Lab Results   Component Value Date    BC No growth-preliminary  09/07/2020    BC No growth-preliminary  09/07/2020     Blood culture #2:No results found for: Nancy Linn  Organism:No results found for: [facility]      Lab Results   Component Value Date    LABGRAM  09/10/2020     Many segmented neutrophils observed. No epithelial cells observed. Many gram positive cocci in pairs and chains. MRSA culture only:No results found for: [facility]  Respiratory culture: No results found for: CULTRESP  Aerobic and Anaerobic :  Lab Results   Component Value Date    LABAERO No growth-preliminary  09/10/2020     No results found for: [facility]    Radiology Reports:  US PUNCTURE DRAINAGE OF LESION ABCESS HEMATOMA BULLA CYST   Final Result   1. Uncomplicated ultrasound guided aspiration of a right parotid abscess. 2. A total of 5 mL of purulent aspirate was obtained with complete collapse of the abscess. 3. The specimen was sent to the laboratory for analysis as per the orders of the referring caregiver. **This report has been created using voice recognition software. It may contain minor errors which are inherent in voice recognition technology. **      Final report electronically signed by Dr. Jn Eid on 9/10/2020 3:12 PM      CT SOFT TISSUE NECK W CONTRAST   Final Result       1. Worsening right-sided peritonitis with developing right intraparotid abscess which has increased in size in the interval.   2. Bilateral lymphadenopathy is likely reactive. **This report has been created using voice recognition software. It may contain minor errors which are inherent in voice recognition technology. **      Final report electronically signed by Dr. Brandie Lynn MD on 9/9/2020 1:55 PM      CT SOFT TISSUE NECK W CONTRAST   Final Result   1. The right parotid gland is enlarged measuring 5.1 x 2.0 x 4.3 cm. There is mixed attenuation throughout the enlarged right parotid gland suggesting a combination of abscess formation and phlegmon. There are stranding densities consistent with    inflammation within the adjacent subcutaneous tissues. There are numerous cervical lymph nodes which most likely are reactive. There is no associated osseous or vascular abnormality. Appropriate clinical management is recommended.             **This report has been created using voice recognition software. It may contain minor errors which are inherent in voice recognition technology. **      Final report electronically signed by Dr. Vasyl Salamanca on 9/7/2020 9:06 PM        Ct Soft Tissue Neck W Contrast    Result Date: 9/7/2020  PROCEDURE: CT SOFT TISSUE NECK W CONTRAST CLINICAL INFORMATION: Right mandibular pain and swelling COMPARISON: No prior study. TECHNIQUE: 3 mm axial imaging through the neck with intravenous contrast from the base of the brain to superior aspect of the heart. All CT scans at this facility use dose modulation, iterative reconstruction, and/or weight based dosing when appropriate to reduce the radiation dose to as low as reasonably achievable. CONTRAST: 80 mL Isovue-370 intravenously. FINDINGS: PHARYNX/ORAL CAVITY/TONSILS: Unremarkable. EPIGLOTTIS: Unremarkable. VALLECULA/PIRIFORM SINUSES: Unremarkable. LARYNX: Unremarkable. CERVICAL LYMPHADENOPATHY: 1. There are no pathologically enlarged lymph nodes. SALIVARY GLANDS/THYROID GLAND: 1. The right parotid gland is enlarged measuring 5.1 x 2.0 x 4.3 cm. There is mixed attenuation throughout the enlarged right parotid gland suggesting a combination of abscess formation and phlegmon. There are stranding densities consistent with inflammation within the adjacent subcutaneous tissues. There are numerous cervical lymph nodes which most likely are reactive. There is no associated osseous or vascular abnormality. 2. The left parotid gland is normal. 3. The thyroid gland is normal. INFLAMMATION: 1. As previously described. VASCULATURE: Unremarkable. LUNG APICES: Unremarkable. OSSEOUS STRUCTURES: Unremarkable. PARANASAL SINUSES: Unremarkable. BRAIN: Unremarkable. OTHER: None. 1. The right parotid gland is enlarged measuring 5.1 x 2.0 x 4.3 cm. There is mixed attenuation throughout the enlarged right parotid gland suggesting a combination of abscess formation and phlegmon.  There are stranding densities consistent with inflammation within the adjacent subcutaneous tissues. There are numerous cervical lymph nodes which most likely are reactive. There is no associated osseous or vascular abnormality. Appropriate clinical management is recommended. **This report has been created using voice recognition software. It may contain minor errors which are inherent in voice recognition technology. ** Final report electronically signed by Dr. Mil Posey on 9/7/2020 9:06 PM        Tele:   [] yes             [x] no      Active Hospital Problems    Diagnosis Date Noted    Abscess of parotid gland [K11.3] 09/07/2020       Electronically signed by Adelina Nation PA-C on 9/12/2020 at 2:56 PM

## 2020-09-13 LAB
BLOOD CULTURE, ROUTINE: NORMAL
BLOOD CULTURE, ROUTINE: NORMAL

## 2020-09-13 PROCEDURE — 6370000000 HC RX 637 (ALT 250 FOR IP): Performed by: PHYSICIAN ASSISTANT

## 2020-09-13 PROCEDURE — 2580000003 HC RX 258: Performed by: PHYSICIAN ASSISTANT

## 2020-09-13 PROCEDURE — 6360000002 HC RX W HCPCS: Performed by: OTOLARYNGOLOGY

## 2020-09-13 PROCEDURE — 1200000000 HC SEMI PRIVATE

## 2020-09-13 PROCEDURE — 6360000002 HC RX W HCPCS: Performed by: PHYSICIAN ASSISTANT

## 2020-09-13 PROCEDURE — 94760 N-INVAS EAR/PLS OXIMETRY 1: CPT

## 2020-09-13 PROCEDURE — 99231 SBSQ HOSP IP/OBS SF/LOW 25: CPT | Performed by: PHYSICIAN ASSISTANT

## 2020-09-13 PROCEDURE — 2580000003 HC RX 258: Performed by: OTOLARYNGOLOGY

## 2020-09-13 PROCEDURE — 99231 SBSQ HOSP IP/OBS SF/LOW 25: CPT | Performed by: OTOLARYNGOLOGY

## 2020-09-13 RX ORDER — AMOXICILLIN AND CLAVULANATE POTASSIUM 875; 125 MG/1; MG/1
1 TABLET, FILM COATED ORAL EVERY 12 HOURS SCHEDULED
Status: DISCONTINUED | OUTPATIENT
Start: 2020-09-13 | End: 2020-09-14 | Stop reason: HOSPADM

## 2020-09-13 RX ORDER — SULFAMETHOXAZOLE AND TRIMETHOPRIM 800; 160 MG/1; MG/1
1 TABLET ORAL EVERY 12 HOURS SCHEDULED
Status: DISCONTINUED | OUTPATIENT
Start: 2020-09-13 | End: 2020-09-14

## 2020-09-13 RX ADMIN — CEFEPIME HYDROCHLORIDE 1 G: 1 INJECTION, POWDER, FOR SOLUTION INTRAMUSCULAR; INTRAVENOUS at 06:17

## 2020-09-13 RX ADMIN — AMOXICILLIN AND CLAVULANATE POTASSIUM 1 TABLET: 875; 125 TABLET, FILM COATED ORAL at 22:07

## 2020-09-13 RX ADMIN — CEFEPIME HYDROCHLORIDE 1 G: 1 INJECTION, POWDER, FOR SOLUTION INTRAMUSCULAR; INTRAVENOUS at 13:56

## 2020-09-13 RX ADMIN — VANCOMYCIN HYDROCHLORIDE 1250 MG: 5 INJECTION, POWDER, LYOPHILIZED, FOR SOLUTION INTRAVENOUS at 04:40

## 2020-09-13 RX ADMIN — SODIUM CHLORIDE: 9 INJECTION, SOLUTION INTRAVENOUS at 10:29

## 2020-09-13 RX ADMIN — SULFAMETHOXAZOLE AND TRIMETHOPRIM 1 TABLET: 800; 160 TABLET ORAL at 22:07

## 2020-09-13 RX ADMIN — VANCOMYCIN HYDROCHLORIDE 1250 MG: 5 INJECTION, POWDER, LYOPHILIZED, FOR SOLUTION INTRAVENOUS at 14:45

## 2020-09-13 ASSESSMENT — PAIN SCALES - GENERAL
PAINLEVEL_OUTOF10: 0

## 2020-09-13 NOTE — PROGRESS NOTES
Hospitalist Progress Note    Patient:  All Carrera    Unit/Bed:5K-11/011-A  YOB: 2000  MRN: [id number]   Acct: [de-identified]   PCP: No primary care provider on file. Code Status: Full Code  Date of Admission: 9/7/2020    Expected Discharge: 9/12? Disposition: Home    Assessment/Plan:    1. Right parotid gland abscess with phlegmon: 5.1x2.0x4.3cm right parotid gland abscess and phlegmon, cervical lymphadenopathy on CT 9/7.   - ENT following. Repeat CT 9/9 showed worsening right sided peritonitis with developing abscess, increased in size from prior. Continue cefepime and vancomycin (started 9/9). Continue sialogogues, heat, massage, oral hygiene and hydration.   - S/p drainage of abscess per IR 9/10. Culture is negative preliminary; however, pt has been on abx prior to it being drawn.  - 9/12: Spoke with ENT who recommended pt stay on IV Abx for another day or two. - 9/13: ENT will see today, await further recs for PO abx. Chief Complaint: right dental pain     HPI / Hospital Course: Per progress note 9/9: Ritika Corbett is a 24 y/o  male without significant past medical history who presented to [facility] on 9/7/20 c/o right jaw pain x1 week. He states that he visited his dentist five days ago and they recommended he have wisdom teeth removed in 12/2020. Pain persisted throughout the week and became unbearable with Ibuprofen/Tylenol regimen at home. He states that he has had decreased PO intake due to the pain associated with chewing. He denies any ear pain, sore throat, vision changes, sour taste in mouth, fever, or chills. No increased salivation. She Beatriz He denies any chest pain, SOB, or productive cough. Workup in ED revealed a right parotid gland abscess with phlegmon. He was started on Unasyn in ED and treated with Solumedrol and morphine. ENT was consulted by ED who recommended admission and monitoring overnight.  If he feels better in AM goal would be to discharge with plan for outpatient ENT. . Patient admitted under hospitalist service for further evaluation and treatment.      9/8-> Patient evaluated resting in his bed, attempting to eat soup. He states that the Norco helps for brief periods of time with the right jaw pain. Reports he is unable to open his mouth any further than he was yesterday. Denies any fever or chills. Denies any ear pain, vision changes, blurry vision, rhinorrhea, or nasal congestion. Denies any sour taste in his mouth. Reports good dental hygiene. \"    9/8: Minimal improvement in pain/swelling/PO tolerance. Unasyn x1 given, pt transitioned to Zosyn. Continuing sialogogues, heat, and massage. 9/9: Pt clinically worsened with pain, swelling, and now difficulty speaking. Per ENT, will repeat CT and switch to Vanc and Cefepime. WBC increased from 8.9 to 12.2. NGTD on blood cx x2.     9/10: Pt with continued severe swelling, similar to day prior. WBC improved. Plan for abscess drainage today per IR. Subjective (past 24 hours):  Pts swelling has significantly improved. He states that he is finally able to open his mouth further and eat solid food without pain. Will talk to ENT about PO abx. PT denies fever/chils, SOB, CP, abd pain, n/v/d.       ROS: Pertinent positives as noted in HPI. All other systems reviewed and negative. Past medical history, family history, social history and allergies reviewed again and is unchanged since admission. Medications:  Reviewed.   Infusion Medications    sodium chloride 100 mL/hr at 09/12/20 0441     Scheduled Medications    cefepime  1 g Intravenous Q8H    vancomycin (VANCOCIN) intermittent dosing (placeholder)   Other RX Placeholder    vancomycin  1,250 mg Intravenous Q8H    sodium chloride flush  10 mL Intravenous 2 times per day     PRN Meds: sodium chloride flush, acetaminophen **OR** acetaminophen, polyethylene glycol, promethazine **OR** ondansetron, HYDROcodone 5 mg - acetaminophen **OR** HYDROcodone 5 mg - acetaminophen    I/O:     Intake/Output Summary (Last 24 hours) at 9/13/2020 0846  Last data filed at 9/13/2020 0309  Gross per 24 hour   Intake 3604.03 ml   Output --   Net 3604.03 ml       Diet:  DIET GENERAL;    Exam:  BP (!) 119/59   Pulse 73   Temp 97.8 °F (36.6 °C) (Oral)   Resp 16   Ht 6' 5\" (1.956 m)   Wt 271 lb (122.9 kg)   SpO2 99%   BMI 32.14 kg/m²   General:   Pleasant young male. NAD. HEENT:  normocephalic and atraumatic. No scleral icterus. PERR. Left facial and neck edema. Painful to palpation. Neck: supple. No JVD. No thyromegaly. Lungs: clear to auscultation. No retractions  Cardiac: RRR without murmur. Abdomen: soft. Nontender. Bowel sounds positive. Extremities:  No clubbing, cyanosis, or edema x 4. Vasculature: capillary refill < 3 seconds. Palpable LE pulses bilaterally. Skin:  warm and dry. Psych:  Alert and oriented x3. Affect appropriate  Lymph:  No supraclavicular adenopathy. Neurologic:  No focal deficit. No seizures. Data: (All radiographs, tracings, PFTs, and imaging are personally viewed and interpreted unless otherwise noted)  Labs:   Recent Labs     09/11/20  0435 09/12/20  0621   WBC 7.5 5.2   HGB 15.0 14.9   HCT 44.1 44.5    262     No results for input(s): NA, K, CL, CO2, BUN, CREATININE, CALCIUM, PHOS in the last 72 hours. Invalid input(s): MAGNES  No results for input(s): AST, ALT, BILIDIR, BILITOT, ALKPHOS in the last 72 hours. No results for input(s): INR in the last 72 hours. No results for input(s): Valentino Bun in the last 72 hours.   Urinalysis:   No results found for: NITRU, WBCUA, BACTERIA, RBCUA, BLOODU, SPECGRAV, GLUCOSEU  Urine culture: No results found for: LABURIN  Micro:   Blood culture #1:   Lab Results   Component Value Date    BC No growth-preliminary  09/07/2020    BC No growth-preliminary  09/07/2020     Blood culture #2:No results found for: BLOODCULT2  Organism:  Lab Results   Component Value Date    [facility] gram positive cocci 09/10/2020         Lab Results   Component Value Date    LABGRAM  09/10/2020     Many segmented neutrophils observed. No epithelial cells observed. Many gram positive cocci in pairs and chains. MRSA culture only:No results found for: [address]   Respiratory culture: No results found for: CULTRESP  Aerobic and Anaerobic :  Lab Results   Component Value Date    LABAERO No growth-preliminary  09/10/2020    LABAERO moderate growth  09/10/2020     No results found for: [facility]    Radiology Reports:  US PUNCTURE DRAINAGE OF LESION ABCESS HEMATOMA BULLA CYST   Final Result   1. Uncomplicated ultrasound guided aspiration of a right parotid abscess. 2. A total of 5 mL of purulent aspirate was obtained with complete collapse of the abscess. 3. The specimen was sent to the laboratory for analysis as per the orders of the referring caregiver. **This report has been created using voice recognition software. It may contain minor errors which are inherent in voice recognition technology. **      Final report electronically signed by Dr. Giuliano Deluca on 9/10/2020 3:12 PM      CT SOFT TISSUE NECK W CONTRAST   Final Result       1. Worsening right-sided peritonitis with developing right intraparotid abscess which has increased in size in the interval.   2. Bilateral lymphadenopathy is likely reactive. **This report has been created using voice recognition software. It may contain minor errors which are inherent in voice recognition technology. **      Final report electronically signed by Dr. Warren Duque MD on 9/9/2020 1:55 PM      CT SOFT TISSUE NECK W CONTRAST   Final Result   1. The right parotid gland is enlarged measuring 5.1 x 2.0 x 4.3 cm. There is mixed attenuation throughout the enlarged right parotid gland suggesting a combination of abscess formation and phlegmon. There are stranding densities consistent with    inflammation within the adjacent subcutaneous tissues. There are numerous cervical lymph nodes which most likely are reactive. There is no associated osseous or vascular abnormality. Appropriate clinical management is recommended. **This report has been created using voice recognition software. It may contain minor errors which are inherent in voice recognition technology. **      Final report electronically signed by Dr. Stephanie Allen on 9/7/2020 9:06 PM        Ct Soft Tissue Neck W Contrast    Result Date: 9/7/2020  PROCEDURE: CT SOFT TISSUE NECK W CONTRAST CLINICAL INFORMATION: Right mandibular pain and swelling COMPARISON: No prior study. TECHNIQUE: 3 mm axial imaging through the neck with intravenous contrast from the base of the brain to superior aspect of the heart. All CT scans at this facility use dose modulation, iterative reconstruction, and/or weight based dosing when appropriate to reduce the radiation dose to as low as reasonably achievable. CONTRAST: 80 mL Isovue-370 intravenously. FINDINGS: PHARYNX/ORAL CAVITY/TONSILS: Unremarkable. EPIGLOTTIS: Unremarkable. VALLECULA/PIRIFORM SINUSES: Unremarkable. LARYNX: Unremarkable. CERVICAL LYMPHADENOPATHY: 1. There are no pathologically enlarged lymph nodes. SALIVARY GLANDS/THYROID GLAND: 1. The right parotid gland is enlarged measuring 5.1 x 2.0 x 4.3 cm. There is mixed attenuation throughout the enlarged right parotid gland suggesting a combination of abscess formation and phlegmon. There are stranding densities consistent with inflammation within the adjacent subcutaneous tissues. There are numerous cervical lymph nodes which most likely are reactive. There is no associated osseous or vascular abnormality. 2. The left parotid gland is normal. 3. The thyroid gland is normal. INFLAMMATION: 1. As previously described. VASCULATURE: Unremarkable. LUNG APICES: Unremarkable. OSSEOUS STRUCTURES: Unremarkable. PARANASAL SINUSES: Unremarkable. BRAIN: Unremarkable. OTHER: None.      1. The right parotid gland is enlarged measuring 5.1 x 2.0 x 4.3 cm. There is mixed attenuation throughout the enlarged right parotid gland suggesting a combination of abscess formation and phlegmon. There are stranding densities consistent with inflammation within the adjacent subcutaneous tissues. There are numerous cervical lymph nodes which most likely are reactive. There is no associated osseous or vascular abnormality. Appropriate clinical management is recommended. **This report has been created using voice recognition software. It may contain minor errors which are inherent in voice recognition technology. ** Final report electronically signed by Dr. Davis Serrano on 9/7/2020 9:06 PM        Tele:   [] yes             [x] no      Active Hospital Problems    Diagnosis Date Noted    Abscess of parotid gland [K11.3] 09/07/2020       Electronically signed by Bertram Boswell PA-C on 9/13/2020 at 8:46 AM

## 2020-09-13 NOTE — PROGRESS NOTES
ENT    Improved swelling, pain and trismus. Afebrile. WBC dropping, well into normal range. Culture: So far, only gram positive cocci in pairs and chains, without further ID and sensitivities. Exam:    Patient smilingIndurated right parotid with central fluctuant area  NO saliva can be expressed from right parotid punctum. No trismus. Rec:  Per Dr Marina Tsai, may d/c IV antibiotics in favor of both Septra DS and Augmentin 875, each bid and observe. Workup for obstruction per Dr NORTON as gland becomes less tender and swollen. Push oral fluids and continue the sialogogues. Fluctuant area might spontaneously drain. ENT will reevaluate tomorrow.       Electronically signed by Nilesh Wallace MD on 9/13/2020 at 4:58 PM

## 2020-09-13 NOTE — PLAN OF CARE
Problem: Pain:  Goal: Pain level will decrease  Description: Pain level will decrease  Outcome: Ongoing  Pain Assessment: 0-10  Pain Level: 0   Patient's Stated Pain Goal: 4   Is pain goal met at this time? Yes     Non-Pharmaceutical Pain Intervention(s): Heat applied, Massage      Problem: SKIN INTEGRITY  Goal: Skin integrity is maintained or improved  Outcome: Ongoing  Note: No new skin issues noted this shift. Patient turns independently. Problem: Discharge Planning:  Goal: Discharged to appropriate level of care  Description: Discharged to appropriate level of care  Outcome: Ongoing  Note: Plan to discharge home. Possible discharge Tuesday after 24 hours on PO antibiotics. No discharge needs. Problem: Falls - Risk of:  Goal: Will remain free from falls  Description: Will remain free from falls  Outcome: Ongoing  Note: Patient remains free from falls this shift. Fall risk assessment complete. Fall sign posted. Non skid socks on. Bed in lowest position, 2/4 siderails up. Bed alarm on. Call light and all possessions within reach. Ambulates independently. Rounding hourly       Problem: Skin Integrity:  Goal: Demonstration of wound healing without infection will improve  Description: Demonstration of wound healing without infection will improve  Outcome: Ongoing  Note: No drainage to incision on right side of face. Heat applied followed by massage. No pain. Care plan reviewed with patient. Patient verbalize understanding of the plan of care and contribute to goal setting.

## 2020-09-14 ENCOUNTER — TELEPHONE (OUTPATIENT)
Dept: FAMILY MEDICINE CLINIC | Age: 20
End: 2020-09-14

## 2020-09-14 VITALS
SYSTOLIC BLOOD PRESSURE: 120 MMHG | RESPIRATION RATE: 16 BRPM | BODY MASS INDEX: 32 KG/M2 | DIASTOLIC BLOOD PRESSURE: 55 MMHG | WEIGHT: 271 LBS | TEMPERATURE: 98.5 F | HEART RATE: 77 BPM | OXYGEN SATURATION: 98 % | HEIGHT: 77 IN

## 2020-09-14 LAB
CREAT SERPL-MCNC: 0.8 MG/DL (ref 0.4–1.2)
ERYTHROCYTE [DISTWIDTH] IN BLOOD BY AUTOMATED COUNT: 12.5 % (ref 11.5–14.5)
ERYTHROCYTE [DISTWIDTH] IN BLOOD BY AUTOMATED COUNT: 40.4 FL (ref 35–45)
GFR SERPL CREATININE-BSD FRML MDRD: > 90 ML/MIN/1.73M2
HCT VFR BLD CALC: 47.6 % (ref 42–52)
HEMOGLOBIN: 16.1 GM/DL (ref 14–18)
MCH RBC QN AUTO: 30.2 PG (ref 26–33)
MCHC RBC AUTO-ENTMCNC: 33.8 GM/DL (ref 32.2–35.5)
MCV RBC AUTO: 89.3 FL (ref 80–94)
PLATELET # BLD: 311 THOU/MM3 (ref 130–400)
PMV BLD AUTO: 8.9 FL (ref 9.4–12.4)
RBC # BLD: 5.33 MILL/MM3 (ref 4.7–6.1)
WBC # BLD: 5.9 THOU/MM3 (ref 4.8–10.8)

## 2020-09-14 PROCEDURE — 36415 COLL VENOUS BLD VENIPUNCTURE: CPT

## 2020-09-14 PROCEDURE — 85027 COMPLETE CBC AUTOMATED: CPT

## 2020-09-14 PROCEDURE — 82565 ASSAY OF CREATININE: CPT

## 2020-09-14 PROCEDURE — 6370000000 HC RX 637 (ALT 250 FOR IP): Performed by: PHYSICIAN ASSISTANT

## 2020-09-14 PROCEDURE — 99239 HOSP IP/OBS DSCHRG MGMT >30: CPT | Performed by: PHYSICIAN ASSISTANT

## 2020-09-14 PROCEDURE — APPNB30 APP NON BILLABLE TIME 0-30 MINS: Performed by: PHYSICIAN ASSISTANT

## 2020-09-14 RX ORDER — SULFAMETHOXAZOLE AND TRIMETHOPRIM 800; 160 MG/1; MG/1
2 TABLET ORAL EVERY 12 HOURS SCHEDULED
Qty: 36 TABLET | Refills: 0 | Status: SHIPPED | OUTPATIENT
Start: 2020-09-14 | End: 2020-09-23

## 2020-09-14 RX ORDER — SULFAMETHOXAZOLE AND TRIMETHOPRIM 800; 160 MG/1; MG/1
2 TABLET ORAL EVERY 12 HOURS SCHEDULED
Status: DISCONTINUED | OUTPATIENT
Start: 2020-09-14 | End: 2020-09-14 | Stop reason: HOSPADM

## 2020-09-14 RX ORDER — AMOXICILLIN AND CLAVULANATE POTASSIUM 875; 125 MG/1; MG/1
1 TABLET, FILM COATED ORAL EVERY 12 HOURS SCHEDULED
Qty: 18 TABLET | Refills: 0 | Status: SHIPPED | OUTPATIENT
Start: 2020-09-14 | End: 2020-09-23

## 2020-09-14 RX ADMIN — AMOXICILLIN AND CLAVULANATE POTASSIUM 1 TABLET: 875; 125 TABLET, FILM COATED ORAL at 08:49

## 2020-09-14 RX ADMIN — SULFAMETHOXAZOLE AND TRIMETHOPRIM 2 TABLET: 800; 160 TABLET ORAL at 18:55

## 2020-09-14 RX ADMIN — AMOXICILLIN AND CLAVULANATE POTASSIUM 1 TABLET: 875; 125 TABLET, FILM COATED ORAL at 18:55

## 2020-09-14 RX ADMIN — SULFAMETHOXAZOLE AND TRIMETHOPRIM 1 TABLET: 800; 160 TABLET ORAL at 09:19

## 2020-09-14 ASSESSMENT — PAIN SCALES - GENERAL
PAINLEVEL_OUTOF10: 0
PAINLEVEL_OUTOF10: 0

## 2020-09-14 NOTE — PLAN OF CARE
Problem: Pain:  Goal: Pain level will decrease  Description: Pain level will decrease  Outcome: Ongoing  Note: Pain Assessment: 0-10  Pain Level: 0   Patient's Stated Pain Goal: 4   Is pain goal met at this time? No     Non-Pharmaceutical Pain Intervention(s): Heat applied, Massage    Problem: SAFETY  Goal: Free from accidental physical injury  Outcome: Ongoing  Note: No falls noted this shift. Fall risk assessment completed. Hourly rounding performed. Bed locked in lowest position, bed alarm on, call light and personal items within reach, and fall sign posted. Patient ambulates to the bathroom with staff assistance nearby. Problem: SKIN INTEGRITY  Goal: Skin integrity is maintained or improved  Outcome: Ongoing  Note: No new skin lesions noted this shift. Patient encouraged to reposition every two hours. Patient repositions self while in bed. Skin assessments completed and ongoing. Problem: Discharge Planning:  Goal: Discharged to appropriate level of care  Description: Discharged to appropriate level of care  Outcome: Ongoing  Note: Patient from home with family. Patient plans to return home with family at discharge. No plans for discharge at this time. Problem: Physical Regulation:  Goal: Will remain free from infection  Description: Will remain free from infection  Outcome: Ongoing  Note: Admitted for abscess of right parotid gland. Patient had abscess drained on 9/10. Tolerating oral foods. IV antibiotics switched to Oral augmentin and bactrim. Daily CHG baths. Pain control. Utilize warm compresses and sour candy. Will continue to evaluate. Care plan reviewed with patient. Patient verbalize understanding of the plan of care and contribute to goal setting.     Electronically signed by Willis Frank RN on 9/14/2020 at 1:07 AM

## 2020-09-14 NOTE — PROGRESS NOTES
Discharge instructions explained. All questions answered. Patient returning back to Dorm with all personal belongings.

## 2020-09-14 NOTE — PROGRESS NOTES
RN asked patient about childhood vaccines per ENT request. Patient stated that he did not have vaccines and was not interested at this time.

## 2020-09-14 NOTE — PROGRESS NOTES
Progress Note  Date:2020       Room:-11/011-A  Patient Jennifer Medeiros     YOB: 2000     Age:20 y.o. Subjective    Subjective: The patient reports overall feeling much better today than yesterday. He describes his trismus as also much better and describes virtually no pain on opening his mouth as wide as he can. He states it is still not as wide as he would normally be able to open it. He is tolerating his medication regimen well. Review of Systems  Objective         Vitals Last 24 Hours:  TEMPERATURE:  Temp  Av °F (36.7 °C)  Min: 97.8 °F (36.6 °C)  Max: 98.5 °F (36.9 °C)  RESPIRATIONS RANGE: Resp  Av  Min: 16  Max: 16  PULSE OXIMETRY RANGE: SpO2  Av.5 %  Min: 96 %  Max: 98 %  PULSE RANGE: Pulse  Av.5  Min: 55  Max: 74  BLOOD PRESSURE RANGE: Systolic (77VDX), UEL:299 , Min:115 , VTN:459   ; Diastolic (19UTA), CZA:91, Min:55, Max:69    I/O (24Hr): Intake/Output Summary (Last 24 hours) at 2020 1006  Last data filed at 2020 0511  Gross per 24 hour   Intake 2486.16 ml   Output 0 ml   Net 2486.16 ml     Objective: On general physical exam the patient's oral aperture was near normal.  Is right cheek is still erythematous and moderately swollen compared to the normal left side. It is warm to the touch but now minimally tender. There is no drainage from his aspiration site. Labs/Imaging/Diagnostics    Labs:  CBC:  Recent Labs     20  0621 20  0856   WBC 5.2 5.9   RBC 4.96 5.33   HGB 14.9 16.1   HCT 44.5 47.6   MCV 89.7 89.3    311     CHEMISTRIES:  Recent Labs     20  0601   CREATININE 0.8     PT/INR:No results for input(s): PROTIME, INR in the last 72 hours. APTT:No results for input(s): APTT in the last 72 hours. LIVER PROFILE:No results for input(s): AST, ALT, BILIDIR, BILITOT, ALKPHOS in the last 72 hours. Imaging Last 24 Hours:  No results found.   Assessment//Plan           Hospital Problems           Last Modified POA Abscess of parotid gland 9/7/2020 Yes        Assessment & Plan based on his history and these findings, the patient is doing very well on his transition to oral medications. Assuming that we are still treating MRSA, I will increase his Bactrim dose up to 2 Bactrim DS pills twice daily rather than a single pill which he is currently on. If he is this well in the morning, we will discharge him to home. I did speak to him at length about the possibility that he would have a recrudescence of this problem once he is taken off of antibiotics or even sometime in the next several months. This would be a strong indicator that he has either a stricture in his duct or he has a stone that was not seen on his imaging study. That is the case, we will refer him to [address] [place] [address] for sialoendoscopy and possible stone removal.  This is a technology and expertise based subspecialty skill set not available in our otolaryngology group. He was fine with that possibility. I spent 20 minutes of face-to-face time with him and his evaluation this morning.     Electronically signed by Beka Capps MD on 9/14/20 at 10:06 AM EDT

## 2020-09-14 NOTE — PLAN OF CARE
Problem: Pain:  Description: Pain management should include both nonpharmacologic and pharmacologic interventions. Goal: Pain level will decrease  Description: Pain level will decrease  Outcome: Ongoing  Patient denies pain when asked. Heat and rest utilized. Problem: SAFETY  Goal: Free from accidental physical injury  Outcome: Ongoing   Patient remains free from injury. Problem: SKIN INTEGRITY  Goal: Skin integrity is maintained or improved  Outcome: Ongoing  No skin breakdown this shift. Patient turns per self. Patients states understanding of repositioning every two hours. Problem: Discharge Planning:  Goal: Discharged to appropriate level of care  Description: Discharged to appropriate level of care  Outcome: Ongoing   Discharge plan is return to college dorm. Problem: Falls - Risk of:  Goal: Will remain free from falls  Description: Will remain free from falls  Outcome: Ongoing  Fall assessment completed. Patient using call light appropriately to call for assistance. Independent with ambulation to bathroom. Personal items within reach. Patient is also compliant with use of non-skid slippers. Problem: Skin Integrity:  Goal: Demonstration of wound healing without infection will improve  Description: Demonstration of wound healing without infection will improve  Outcome: Ongoing   Patient remains afebrile swelling decreasing. Antibiotics administered per order. Problem: Physical Regulation:  Goal: Will remain free from infection  Description: Will remain free from infection  Outcome: Ongoing  Patient remains afebrile. Oral antibiotics administered per order. Care plan reviewed with patient. Patient verbalize understanding of the plan of care and contribute to goal setting.

## 2020-09-14 NOTE — PROGRESS NOTES
I interviewed the patient previously and he stated that he is unsure if he received his childhood vaccines. Overall, the patient is reportedly doing much better. Final culture results from aspiration are still pending. Aerobic Culture Abnormal    09/10/2020 11:21 AM  [facility]    No growth-preliminary     Gram Stain Result  09/10/2020 11:21 AM  [facility]    Many segmented neutrophils observed. No epithelial cells observed. Many gram positive cocci in pairs and chains. Organism Abnormal    09/10/2020 11:21 AM  [facility]    Streptococcus anginosus    Aerobic Culture  09/10/2020 11:21 AM  [facility]    light growth Streptococcus species (Viridans group) are generallysusceptible to Penicillin. Organism Abnormal    09/10/2020 11:21 AM  [facility]    gram positive cocci    Anaerobic Culture  09/10/2020 11:21 AM  [facility]    heavy growth      Plan:  -I discussed the patient with Dr. Truman Rodriguez. He would like for the Bactrim DS to be increased to 2 tablets, twice daily and continue Augmentin 875-125 BID. Repeat CBC in AM. If the patient continues to do well on oral antibiotics and CBC looks ok, he will likely be ok for discharge.  -Also recommended the patient contact his parents and discuss his vaccination history. If he has not had his MMR/Varicella previously and is willing to get them, we may be able to offer them while inpatient. Imperative to resolution of parotitis (these are not optional/prn):            -Continue oral hydration and hygiene.            - Apply heating pad for 15 minutes followed by gentle massage (over parotid gland and towards front of mouth, this is to milk the gland. The duct from the gland empties in the mouth across from the top molars).  Do this every 4 hours while awake. He may use the heating pad PRN between using routinely every 4 hours.            - Use sialogogues (increases saliva production)- lemon wedges, sour candies (warheads, lemon drops, etc) every 3-4 hours while awake.

## 2020-09-15 ENCOUNTER — OFFICE VISIT (OUTPATIENT)
Dept: FAMILY MEDICINE CLINIC | Age: 20
End: 2020-09-15

## 2020-09-15 VITALS
HEIGHT: 77 IN | HEART RATE: 87 BPM | BODY MASS INDEX: 32.35 KG/M2 | DIASTOLIC BLOOD PRESSURE: 78 MMHG | TEMPERATURE: 97.6 F | SYSTOLIC BLOOD PRESSURE: 118 MMHG | OXYGEN SATURATION: 99 % | WEIGHT: 274 LBS | RESPIRATION RATE: 10 BRPM

## 2020-09-15 PROCEDURE — 99203 OFFICE O/P NEW LOW 30 MIN: CPT | Performed by: NURSE PRACTITIONER

## 2020-09-15 ASSESSMENT — PATIENT HEALTH QUESTIONNAIRE - PHQ9
SUM OF ALL RESPONSES TO PHQ QUESTIONS 1-9: 0
2. FEELING DOWN, DEPRESSED OR HOPELESS: 0
SUM OF ALL RESPONSES TO PHQ9 QUESTIONS 1 & 2: 0
SUM OF ALL RESPONSES TO PHQ QUESTIONS 1-9: 0
1. LITTLE INTEREST OR PLEASURE IN DOING THINGS: 0

## 2020-09-15 NOTE — CARE COORDINATION
Late entry, patient discharged to home last evening with no services added/requested. 9/15/20, 9:37 AM EDT    Patient goals/plan/ treatment preferences discussed by  and . Patient goals/plan/ treatment preferences reviewed with patient/ family. Patient/ family verbalize understanding of discharge plan and are in agreement with goal/plan/treatment preferences. Understanding was demonstrated using the teach back method. AVS provided by RN at time of discharge, which includes all necessary medical information pertaining to the patients current course of illness, treatment, post-discharge goals of care, and treatment preferences.

## 2020-09-15 NOTE — PROGRESS NOTES
[facility] at [address] [facility] [address], [address]  Chief Complaint   Patient presents with   [facility] Patient     needs established    Follow-Up from [facility] [place] parotid gland d/c 09.14.2020       History obtained from chart review and the patient. SUBJECTIVE:  Laz Gonzales is a 23 y.o. male that presents today for establishing care with new physician, etc. New patient, [facility] to [facility] [address]. Patient presents today to est new doctor and also to f/u recent hospitalization for parotid gland abscess. He was admitted to [facility] 9/7/20 for right jaw pain. He had recently been to his dentist prior who recommended he get his wisdom teeth removed. Pain persisted throughout the week and became unbearable with tylenol/motrin. He presented to the ER and workup revealed right parotid gland abscess with phlegmon. He was started on IV Abx and admitted overnight for OBS. ENT was consulted and recommended admission IV Abx. He was admitted for 1 week, had difficulties speaking, worsening pain and swelling. The patient ended up going for interventional radiology drain of the abscess with drain placement 9/10. He was d/c home in stable condition on PO Augmentin and Bactrim    Patient is home and is doing well. He is feeling improved. He is taking both Bactrim and Augmentin as prescribed. No fever. Swelling is improving. No difficulties eating or talking. He does have plans to schedule f/u appt with ENT. He is utilizing sialogogues frequently.       Age/Gender Health Maintenance    Lipid - 40  DM Screen - 40  Colon Cancer Screening - 48  Lung Cancer Screening (Age 54 to [de-identified] with 30 pack year hx, current smoker or quit within past 15 years) - n/a    Tetanus - needs  Influenza Vaccine - yearly  Pneumonia Vaccine - 65  Zostavax - 50   HPV Vaccine - n/a    PSA testing discussion - 54  AAA Screening - n/a    Falls screening - n/a    Current Outpatient Medications   Medication Sig Dispense Refill    sulfamethoxazole-trimethoprim (BACTRIM DS;SEPTRA DS) 800-160 MG per tablet Take 2 tablets by mouth every 12 hours for 9 days 36 tablet 0    amoxicillin-clavulanate (AUGMENTIN) 875-125 MG per tablet Take 1 tablet by mouth every 12 hours for 9 days 18 tablet 0    acetaminophen (TYLENOL) 500 MG tablet Take 500 mg by mouth every 6 hours as needed for Pain      ibuprofen (ADVIL;MOTRIN) 600 MG tablet Take 1 tablet by mouth every 6 hours as needed for Pain 30 tablet 0     No current facility-administered medications for this visit. No orders of the defined types were placed in this encounter. All medications reviewed and reconciled, including OTC and herbal medications. Updated list given to patient. Patient Active Problem List   Diagnosis    Abscess of parotid gland       Past Medical History:   Diagnosis Date    Abscess of parotid gland        History reviewed. No pertinent surgical history. No Known Allergies    Social History     Socioeconomic History    Marital status: Single     Spouse name: Not on file    Number of children: Not on file    Years of education: Not on file    Highest education level: Not on file   Occupational History    Not on file   Social Needs    Financial resource strain: Not on file    Food insecurity     Worry: Not on file     Inability: Not on file    Transportation needs     Medical: Not on file     Non-medical: Not on file   Tobacco Use    Smoking status: Never Smoker    Smokeless tobacco: Never Used   Substance and Sexual Activity    Alcohol use:  Yes     Alcohol/week: 5.0 - 15.0 standard drinks     Types: 5 - 15 Cans of beer per week     Comment: on the weekends    Drug use: Never    Sexual activity: Yes     Partners: Female   Lifestyle    Physical activity     Days per week: Not on file     Minutes per session: Not on file    Stress: Not on file   Relationships    Social connections     Talks on phone: Not on file     Gets together: Not on file Attends Congregational service: Not on file     Active member of club or organization: Not on file     Attends meetings of clubs or organizations: Not on file     Relationship status: Not on file    Intimate partner violence     Fear of current or ex partner: Not on file     Emotionally abused: Not on file     Physically abused: Not on file     Forced sexual activity: Not on file   Other Topics Concern    Not on file   Social History Narrative    Not on file       No family history on file. I have reviewed the patient's past medical history, past surgical history, allergies, medications, social and family history and I have made updates where appropriate. Review of Systems  Positive responses are highlighted in bold    Constitutional:  Fever, Chills, Night Sweats, Fatigue, Unexpected changes in weight  Eyes:  Eye discharge, Eye pain, Eye redness, Visual disturbances   HENT:  Ear pain, Tinnitus, Nosebleeds, Trouble swallowing, Hearing loss, Sore throat  Cardiovascular:  Chest Pain, Palpitations, Orthopnea, Paroxysmal Nocturnal Dyspnea  Respiratory:  Cough, Wheezing, Shortness of breath, Chest tightness, Apnea  Gastrointestinal:  Nausea, Vomiting, Diarrhea, Constipation, Heartburn, Blood in stool  Genitourinary:  Difficulty or painful urination, Flank pain, Change in frequency, Urgency  Skin:  Color change, Rash, Itching, Wound  Psychiatric:  Hallucinations, Anxiety, Depression, Suicidal ideation  Hematological:  Enlarged glands, Easy bleeding, Easily bruising  Musculoskeletal:  Joint pain, Back pain, Gait problems, Joint swelling, Myalgias  Neurological:  Dizziness, Headaches, Presyncope, Numbness, Seizures, Tremors  Allergy:  Environmental allergies, Food allergies  Endocrine:  Heat Intolerance, Cold Intolerance, Polydipsia, Polyphagia, Polyuria    CT neck 9/9/20  1.  Worsening right-sided peritonitis with developing right intraparotid abscess which has increased in size in the interval.    2. Bilateral lymphadenopathy is likely reactive. PHYSICAL EXAM:  Vitals:    09/15/20 0901   BP: 118/78   Pulse: 87   Resp: 10   Temp: 97.6 °F (36.4 °C)   TempSrc: Oral   SpO2: 99%   Weight: 274 lb (124.3 kg)   Height: 6' 4.97\" (1.955 m)     Body mass index is 32.52 kg/m². VS Reviewed  General Appearance: A&O x 3, No acute distress,well developed and well- nourished  Head: normocephalic and atraumatic  Eyes: pupils equal, round, and reactive to light, extraocular eye movements intact, conjunctivae and eye lids without erythema  ENT: external ear and ear canal clear bilaterally, TMs intact and regular, nose without deformity, nasal mucosa and turbinates normal without polyps, oropharynx normal, dentition is normal for age  Neck: supple and non-tender, mild swelling and erythema right parotid gland, no thyromegaly or thyroid nodules, no cervical lymphadenopathy  Pulmonary/Chest: clear to auscultation bilaterally- no wheezes, rales or rhonchi, normal air movement, no respiratory distress or retractions  Cardiovascular: S1 and S2 auscultated w/ RRR. No murmurs, rubs, clicks, or gallops, distal pulses intact. Abdomen: soft, non-tender, non-distended, bowl sounds physiologic,  no rebound or guarding, no masses or hernias noted. Liver and spleen without enlargement. Extremities: no cyanosis, clubbing or edema of the lower extremities  Musculoskeletal: No joint swelling or gross deformity   Neuro:  Alert, 5/5 strength globally and symmetrically  Psych: Affect appropriate. Mood normal. Thought process is normal without evidence of depression or psychosis. Good insight and appropriate interaction. Cognition and memory appear to be intact.   Skin: warm and dry, no rash or erythema  Lymph:  No cervical, auricular or supraclavicular lymph nodes palpated    ASSESSMENT & PLAN  Valentina Rmairez was seen today for new patient and follow-up from hospital.    Diagnoses and all orders for this visit:    Abscess of parotid gland    Encounter

## 2020-09-15 NOTE — DISCHARGE SUMMARY
Hospitalist Discharge Summary    Patient: Omar Lamas  YOB: 2000  MRN: [id number]   Acct: [de-identified]    Primary Care Physician: HERI Ferreira CNP    Admit date  9/7/2020    Discharge date:  9/14/2020  Disposition: Home      Discharge Assessment and Plan:    1. Right parotid gland abscess with phlegmon: 5.1x2.0x4.3cm right parotid gland abscess and phlegmon, cervical lymphadenopathy on CT 9/7.  - ENT following. Repeat CT 9/9 showed worsening right sided peritonitis with developing abscess, increased in size from prior. Continue cefepime and vancomycin (started 9/9). Continue sialogogues, heat, massage, oral hygiene and hydration.   - S/p drainage of abscess per IR 9/10. Culture showed strept viridans group.   - 9/13: Significant improvement in swelling and pain; wbc has remained wnl. IV Abx stopped and transitioned to PO Bactrim and Augmentin per ENT. -9/14: Patient stable overnight. Will complete 10 day antibiotic course and follow up with ENT OP in 1-2 weeks. Chief Complaint on presentation: right dental pain    Initial H&P / Hospital Course: Per progress note 9/9: Ashley Bowden is a 22 y/o  male without significant past medical history who presented to [facility] on 9/7/20 c/o right jaw pain x1 week. He states that he visited his dentist five days ago and they recommended he have wisdom teeth removed in 12/2020. Pain persisted throughout the week and became unbearable with Ibuprofen/Tylenol regimen at home. He states that he has had decreased PO intake due to the pain associated with chewing. He denies any ear pain, sore throat, vision changes, sour taste in mouth, fever, or chills. No increased salivation. Kuo Espinoza He denies any chest pain, SOB, or productive cough. Workup in ED revealed a right parotid gland abscess with phlegmon. He was started on Unasyn in ED and treated with Solumedrol and morphine.  ENT was consulted by ED who recommended admission and monitoring overnight. If he feels better in AM goal would be to discharge with plan for outpatient ENT. . Patient admitted under hospitalist service for further evaluation and treatment.      9/8-> Patient evaluated resting in his bed, attempting to eat soup.  He states that the Norco helps for brief periods of time with the right jaw pain.  Reports he is unable to open his mouth any further than he was yesterday.  Denies any fever or chills.  Denies any ear pain, vision changes, blurry vision, rhinorrhea, or nasal congestion.  Denies any sour taste in his mouth.  Reports good dental hygiene. \"     9/8: Minimal improvement in pain/swelling/PO tolerance. Unasyn x1 given, pt transitioned to Zosyn. Continuing sialogogues, heat, and massage.      9/9: Pt clinically worsened with pain, swelling, and now difficulty speaking. Per ENT, will repeat CT and switch to Vanc and Cefepime. WBC increased from 8.9 to 12.2. NGTD on blood cx x2.      9/10: Pt with continued severe swelling, similar to day prior. WBC improved. Plan for abscess drainage today per IR.     9/13: Pts swelling has significantly improved. He states that he is finally able to open his mouth further and eat solid food without pain. Will talk to ENT about PO abx. Subjective (day of discharge): Patient is doing great. He states he is able to open his mouth and is able to eat without pain. Swelling has improved. He is afebrile, no leukocytosis, no difficulty swallowing or breathing. Patient responded well to medical management and is discharged in stable condition with appropriate follow up. Physical Exam:-  Vitals: No data found. Weight: Weight: 271 lb (122.9 kg)   24 hour intake/output: No intake or output data in the 24 hours ending 09/15/20 5366    General appearance: No apparent distress, appears stated age and cooperative. HEENT: Normal cephalic, atraumatic without obvious deformity. Pupils equal, round, and reactive to light.   Extra ocular muscles pairs and chains. MRSA culture only:No results found for: [address]  Urine culture: No results found for: Sandra Woods  Lab Results   Component Value Date    ORG Streptococcus anginosus 09/10/2020    ORG gram positive cocci 09/10/2020      Respiratory culture: No results found for: CULTRESP  Aerobic and Anaerobic :  Lab Results   Component Value Date    LABAERO No growth-preliminary  09/10/2020    LABAERO  09/10/2020     light growth Streptococcus species (Viridans group) are generallysusceptible to Penicillin. Lab Results   Component Value Date    LABANAE heavy growth  09/10/2020       Urinalysis:   No results found for: Lorie Fell, BACTERIA, RBCUA, Yuriy River, GLUCOSEU    Radiology:  Ct Soft Tissue Neck W Contrast    Result Date: 9/7/2020  PROCEDURE: CT SOFT TISSUE NECK W CONTRAST CLINICAL INFORMATION: Right mandibular pain and swelling COMPARISON: No prior study. TECHNIQUE: 3 mm axial imaging through the neck with intravenous contrast from the base of the brain to superior aspect of the heart. All CT scans at this facility use dose modulation, iterative reconstruction, and/or weight based dosing when appropriate to reduce the radiation dose to as low as reasonably achievable. CONTRAST: 80 mL Isovue-370 intravenously. FINDINGS: PHARYNX/ORAL CAVITY/TONSILS: Unremarkable. EPIGLOTTIS: Unremarkable. VALLECULA/PIRIFORM SINUSES: Unremarkable. LARYNX: Unremarkable. CERVICAL LYMPHADENOPATHY: 1. There are no pathologically enlarged lymph nodes. SALIVARY GLANDS/THYROID GLAND: 1. The right parotid gland is enlarged measuring 5.1 x 2.0 x 4.3 cm. There is mixed attenuation throughout the enlarged right parotid gland suggesting a combination of abscess formation and phlegmon. There are stranding densities consistent with inflammation within the adjacent subcutaneous tissues. There are numerous cervical lymph nodes which most likely are reactive. There is no associated osseous or vascular abnormality.  2. The left parotid gland is normal. 3. The thyroid gland is normal. INFLAMMATION: 1. As previously described. VASCULATURE: Unremarkable. LUNG APICES: Unremarkable. OSSEOUS STRUCTURES: Unremarkable. PARANASAL SINUSES: Unremarkable. BRAIN: Unremarkable. OTHER: None. 1. The right parotid gland is enlarged measuring 5.1 x 2.0 x 4.3 cm. There is mixed attenuation throughout the enlarged right parotid gland suggesting a combination of abscess formation and phlegmon. There are stranding densities consistent with inflammation within the adjacent subcutaneous tissues. There are numerous cervical lymph nodes which most likely are reactive. There is no associated osseous or vascular abnormality. Appropriate clinical management is recommended. **This report has been created using voice recognition software. It may contain minor errors which are inherent in voice recognition technology. ** Final report electronically signed by Dr. Nicolas Phan on 9/7/2020 9:06 PM       Consults:   STR ED TO IP CONSULT  IP CONSULT TO OTOLARYNGOLOGY    Discharge Medications:      Medication List      START taking these medications    amoxicillin-clavulanate 875-125 MG per tablet  Commonly known as:  AUGMENTIN  Take 1 tablet by mouth every 12 hours for 9 days     sulfamethoxazole-trimethoprim 800-160 MG per tablet  Commonly known as:  BACTRIM DS;SEPTRA DS  Take 2 tablets by mouth every 12 hours for 9 days        CONTINUE taking these medications    acetaminophen 500 MG tablet  Commonly known as:  TYLENOL     ibuprofen 600 MG tablet  Commonly known as:  ADVIL;MOTRIN  Take 1 tablet by mouth every 6 hours as needed for Pain           Where to Get Your Medications      These medications were sent to [address] [facility] [facility] -  [phone] [address] [place] [facility]    Phone:  [phone]   · amoxicillin-clavulanate 875-125 MG per tablet  · sulfamethoxazole-trimethoprim 800-160 MG per tablet          Patient Instructions:    Discharge lab work: cbc  Activity: activity as tolerated  Diet: No diet orders on file      Follow-up visits:   HERI Massey CNP  [address]  [address] [address] [phone]    On 9/15/2020  Appointment time is 09:00 a.m. with HERI Gale CNP. Please call the office prior to your appointment time to complete the registration process. Yee Ren MD  [address] [facility] [address] [facility] [phone]    Schedule an appointment as soon as possible for a visit in 10 days  follow up post hospital discharge         Disposition: home  Condition at Discharge: Stable    Time Spent: 50 minutes    Signed: Thank you HERI Massey CNP for the opportunity to be involved in this patient's care.     Electronically signed by Nadeen Sandoval PA-C on 9/15/2020 at 5:46 PM  Discharging Hospitalist

## 2020-09-16 LAB
AEROBIC CULTURE: ABNORMAL
AEROBIC CULTURE: ABNORMAL
ANAEROBIC CULTURE: ABNORMAL
GRAM STAIN RESULT: ABNORMAL
ORGANISM: ABNORMAL

## 2020-09-17 ENCOUNTER — TELEPHONE (OUTPATIENT)
Dept: ENT CLINIC | Age: 20
End: 2020-09-17

## 2020-09-17 NOTE — TELEPHONE ENCOUNTER
Received a note from Isaias yesterday to contact patient and schedule a hospital follow up with Dr Lea Saucedo on 9/22 or 9/23. I called patient on 9/16/20 and 9/17/20 no answer/voicemail and I also called emergency contact numbers both days with no answer/voicemails. Sent letter to patient to call and schedule.

## 2020-09-23 ENCOUNTER — OFFICE VISIT (OUTPATIENT)
Dept: ENT CLINIC | Age: 20
End: 2020-09-23

## 2020-09-23 VITALS
HEIGHT: 77 IN | RESPIRATION RATE: 20 BRPM | DIASTOLIC BLOOD PRESSURE: 72 MMHG | WEIGHT: 274.7 LBS | TEMPERATURE: 97.2 F | SYSTOLIC BLOOD PRESSURE: 114 MMHG | HEART RATE: 80 BPM | BODY MASS INDEX: 32.43 KG/M2

## 2020-09-23 PROBLEM — K11.21 ACUTE SUPPURATIVE PAROTITIS: Status: ACTIVE | Noted: 2020-09-23

## 2020-09-23 PROBLEM — K11.5 PAROTID SIALOLITHIASIS: Status: ACTIVE | Noted: 2020-09-23

## 2020-09-23 PROBLEM — R25.2 TRISMUS: Status: ACTIVE | Noted: 2020-09-23

## 2020-09-23 PROCEDURE — 99213 OFFICE O/P EST LOW 20 MIN: CPT | Performed by: OTOLARYNGOLOGY

## 2020-09-23 NOTE — PROGRESS NOTES
[facility] PROFESSIONAL [facility] EAR, NOSE AND THROAT  [address]  Dept: [phone]  Dept Fax: [phone]  Loc: [phone]    Ta Waller is a 23 y.o. male who was referred by No ref. provider found for:  Chief Complaint   Patient presents with    Follow-up     Patient here for follow up from being seen at [facility]. HPI:     Ta Waller is a 23 y.o. male who was admitted for several days for IV antibiotics with severe right bacterial parotitis. He ultimately responded only to very broad-spectrum IV antibiotics and then was converted to oral antibiotics before discharge. He is here today reporting no residual pain or limits in his mouth opening. He is still using \"lemon head\" candies which are very sour and promote salivary discharge. He is no longer feeling that his right parotid gland squeezes and cannot detect any extra saliva on his right side compared to his left side which flows freely. He has had no fevers. He is not using warm compresses any longer. He is on no antibiotics. History:     No Known Allergies  Current Outpatient Medications   Medication Sig Dispense Refill    sulfamethoxazole-trimethoprim (BACTRIM DS;SEPTRA DS) 800-160 MG per tablet Take 2 tablets by mouth every 12 hours for 9 days 36 tablet 0    amoxicillin-clavulanate (AUGMENTIN) 875-125 MG per tablet Take 1 tablet by mouth every 12 hours for 9 days 18 tablet 0    acetaminophen (TYLENOL) 500 MG tablet Take 500 mg by mouth every 6 hours as needed for Pain      ibuprofen (ADVIL;MOTRIN) 600 MG tablet Take 1 tablet by mouth every 6 hours as needed for Pain 30 tablet 0     No current facility-administered medications for this visit. Past Medical History:   Diagnosis Date    Abscess of parotid gland       History reviewed. No pertinent surgical history. History reviewed. No pertinent family history.   Social History     Tobacco Use    Smoking status: Never Smoker    Smokeless tobacco: Never Used   Substance Use Topics    Alcohol use: Yes     Alcohol/week: 5.0 - 15.0 standard drinks     Types: 5 - 15 Cans of beer per week     Comment: on the weekends        Subjective:      Review of Systems  Rest of review of systems are negative, except as noted in HPI. Objective:     /72 (Site: Left Upper Arm, Position: Sitting)   Pulse 80   Temp 97.2 °F (36.2 °C) (Infrared)   Resp 20   Ht 6' 5\" (1.956 m)   Wt 274 lb 11.2 oz (124.6 kg)   BMI 32.57 kg/m²     Physical Exam       On general physical exam the patient is a pleasant well-appearing young adult male in no acute distress. His face is symmetrical with respect to his parotid gland with only the slightest fullness on the right side that I am noticing only because I am looking for it. On palpation his left parotid gland is within normal limits. His right side is abnormal for a very firm even hard parotid gland that is completely nontender. It is slightly enlarged compared to the other side. I detected no adenopathy no thyromegaly in his neck. His oral cavity was abnormal for the presence of no salivary flow being visualized through his Stensen's duct. His mouth had a normal amount of saliva throughout. Vitals reviewed. Ct Soft Tissue Neck W Contrast    Result Date: 9/9/2020   1. Worsening right-sided peritonitis with developing right intraparotid abscess which has increased in size in the interval. 2. Bilateral lymphadenopathy is likely reactive. **This report has been created using voice recognition software. It may contain minor errors which are inherent in voice recognition technology. ** Final report electronically signed by Dr. Lorena Carlisle MD on 9/9/2020 1:55 PM    Ct Soft Tissue Neck W Contrast    Result Date: 9/7/2020  1. The right parotid gland is enlarged measuring 5.1 x 2.0 x 4.3 cm.  There is mixed attenuation throughout the enlarged right parotid gland suggesting a combination of abscess formation and phlegmon. There are stranding densities consistent with inflammation within the adjacent subcutaneous tissues. There are numerous cervical lymph nodes which most likely are reactive. There is no associated osseous or vascular abnormality. Appropriate clinical management is recommended. **This report has been created using voice recognition software. It may contain minor errors which are inherent in voice recognition technology. ** Final report electronically signed by Dr. Jn Eid on 9/7/2020 9:06 PM    Us Puncture Drainage Of Lesion Abcess Hematoma Bulla Cyst    Result Date: 9/10/2020  1. Uncomplicated ultrasound guided aspiration of a right parotid abscess. 2. A total of 5 mL of purulent aspirate was obtained with complete collapse of the abscess. 3. The specimen was sent to the laboratory for analysis as per the orders of the referring caregiver. **This report has been created using voice recognition software. It may contain minor errors which are inherent in voice recognition technology. ** Final report electronically signed by Dr. Jn Eid on 9/10/2020 3:12 PM     Lab Results   Component Value Date     09/10/2020     09/08/2020     09/07/2020    K 3.8 09/10/2020    K 4.7 09/08/2020    K 4.2 09/07/2020     09/10/2020     09/08/2020     09/07/2020    CO2 26 09/10/2020    CO2 23 09/08/2020    CO2 23 09/07/2020    BUN 9 09/10/2020    BUN 7 09/08/2020    BUN 7 09/07/2020    CREATININE 0.8 09/14/2020    CREATININE 0.6 09/10/2020    CREATININE 0.5 09/08/2020    CALCIUM 9.1 09/10/2020    CALCIUM 9.9 09/08/2020    CALCIUM 9.5 09/07/2020    PROT 7.8 09/07/2020    LABALBU 4.7 09/07/2020    BILITOT 0.5 09/07/2020    ALKPHOS 134 09/07/2020    AST 34 09/07/2020    ALT 43 09/07/2020       All of the past medical history, past surgical history, family history,social history, allergies and current medications were reviewed with the patient.      Assessment & Plan Diagnoses and all orders for this visit:     Diagnosis Orders   1. Acute suppurative parotitis      Quiesent   2. Trismus      Resolved   3. Parotid sialolithiasis      Likely diagnosis but as yet unproven         Based on his history and these physical findings, I am very pleased for the patient that he is so asymptomatic apart from having no perceptible salivary flow through that gland. Although his imaging failed to show a stone within the gland, the fact that his teeth were in the region of interest and that his amalgams caused a substantial amount of scatter as they normally do, he could have a large stone that was simply not visualized. As such I broach the possibility of referring him to [facility] for sialoendoscopy which they likely provide. However the patient has no insurance and has never applied for Medicaid. He works full-time and is a full-time student as well and probably would qualify for it given his hourly rate. Since he has a significant chance of needing a substantial amount of care possibly even including a parotidectomy, I strongly recommended that he get evaluated by our financial assistance group here at [facility]. I would strongly support his assistance by whatever means in the near future. He says today he has had no bills delivered to his home but he understands they are likely to come. I will see him back in the next 3 months to make sure that he is thriving. He will contact me in the event that his gland swells or that his trismus returns. I will start him on broad-spectrum oral antibiotics should that occur. I recommended that he gets actual lemon juice and places it on his right tongue several times a day to keep salivary flow going through that right side of at all possible.   I broached the possibility of ligating his Stensen's duct to block the gland definitively but I would not want to do that without knowing that what ever stones are in his gland have been removed. Answered his questions and addressed his concerns. He assured me that he would proceed with getting financial counseling beginning today. Return in about 3 months (around 12/23/2020) for Follow-up examination. **This report has been created using voice recognition software. It may contain minor errors which are inherent in voice recognition technology. **

## 2020-10-15 ENCOUNTER — TELEPHONE (OUTPATIENT)
Dept: ENT CLINIC | Age: 20
End: 2020-10-15

## 2020-10-15 NOTE — TELEPHONE ENCOUNTER
I called patient on 9/23/20 and 10/15/20  and was unable to reach patient regarding billing questions and financial assistance. Patient had questions regarding his bill and if he could apply for Medicaid. I spoke with the  at [facility] and patients income was over the limit to qualify. Patient was given an application for financial assistance.

## 2021-03-24 ENCOUNTER — OFFICE VISIT (OUTPATIENT)
Dept: FAMILY MEDICINE CLINIC | Age: 21
End: 2021-03-24

## 2021-03-24 VITALS
RESPIRATION RATE: 10 BRPM | OXYGEN SATURATION: 100 % | HEART RATE: 68 BPM | BODY MASS INDEX: 34.94 KG/M2 | TEMPERATURE: 98.3 F | SYSTOLIC BLOOD PRESSURE: 126 MMHG | WEIGHT: 281 LBS | HEIGHT: 75 IN | DIASTOLIC BLOOD PRESSURE: 70 MMHG

## 2021-03-24 DIAGNOSIS — K21.9 GASTROESOPHAGEAL REFLUX DISEASE, UNSPECIFIED WHETHER ESOPHAGITIS PRESENT: ICD-10-CM

## 2021-03-24 DIAGNOSIS — R07.89 ATYPICAL CHEST PAIN: Primary | ICD-10-CM

## 2021-03-24 PROCEDURE — 99213 OFFICE O/P EST LOW 20 MIN: CPT | Performed by: NURSE PRACTITIONER

## 2021-03-24 RX ORDER — OMEPRAZOLE 20 MG/1
20 CAPSULE, DELAYED RELEASE ORAL
Qty: 30 CAPSULE | Refills: 5 | Status: SHIPPED | OUTPATIENT
Start: 2021-03-24 | End: 2021-10-18 | Stop reason: ALTCHOICE

## 2021-03-24 ASSESSMENT — PATIENT HEALTH QUESTIONNAIRE - PHQ9
SUM OF ALL RESPONSES TO PHQ9 QUESTIONS 1 & 2: 0
SUM OF ALL RESPONSES TO PHQ QUESTIONS 1-9: 0
SUM OF ALL RESPONSES TO PHQ QUESTIONS 1-9: 0

## 2021-03-24 NOTE — PROGRESS NOTES
[facility] at / [address], [address]  Chief Complaint   Patient presents with    Other     irritation in chest  fees  lik an  anxious exciting  it  always  been there  for last  year  started  after a sinus  infection Dec 2019        History obtained from chart review and the patient. SUBJECTIVE:  Asa Brumfield is a 23 y.o. male that presents today for chest discomfort    He reports that he was sick with sinus/bronchitis end of 2019. He has had this pain/discomfort ever since. He always has this pain or discomfort, irritation, but it's worse after eating. He also notes that he gets a lot of mucous and nasal congestion after he eats. Denies any heart burn. Chest Pain    Location -  mid chest  Symptoms have been present for 1.5 year(s). Onset of symptoms was gradual.  Inciting Event? No       Description of chest pain -heaviness . The pain does not radiate. Intensity - moderate. Aggravating factors - eating makes it worse, also notes it worsens when he fixates on it  Alleviating factors -  nothing     N/V? No  SOB? He occasionally feels like he's not fully getting the potential of his breath  Lightheadedness? No  Palpitations? No  Diaphoresis? No  Cough? No    He does note that he gets periodic sore throat.     Cardiac risk factors (ex. smoking/tobacco exposure, family history, diabetes mellitus, hypertension, dyslipidemia, low HDL, metabolic syndrome, kidney disease, peripheral vascular disease, AAA, obesity or sedentary lifestyle)?  n/a    Age/Gender Health Maintenance    Lipid - 40  DM Screen - 40  Colon Cancer Screening - 48  Lung Cancer Screening (Age 54 to [de-identified] with 30 pack year hx, current smoker or quit within past 15 years) - n/a    Tetanus - needs  Influenza Vaccine - yearly  Pneumonia Vaccine - 65  Zostavax - 50   HPV Vaccine - n/a    PSA testing discussion - 54  AAA Screening - n/a    Falls screening - n/a    Current Outpatient Medications Relationships    Social connections     Talks on phone: Not on file     Gets together: Not on file     Attends Druze service: Not on file     Active member of club or organization: Not on file     Attends meetings of clubs or organizations: Not on file     Relationship status: Not on file    Intimate partner violence     Fear of current or ex partner: Not on file     Emotionally abused: Not on file     Physically abused: Not on file     Forced sexual activity: Not on file   Other Topics Concern    Not on file   Social History Narrative    Not on file       No family history on file. I have reviewed the patient's past medical history, past surgical history, allergies, medications, social and family history and I have made updates where appropriate.       Review of Systems  Positive responses are highlighted in bold    Constitutional:  Fever, Chills, Night Sweats, Fatigue, Unexpected changes in weight  Eyes:  Eye discharge, Eye pain, Eye redness, Visual disturbances   HENT:  Ear pain, Tinnitus, Nosebleeds, Trouble swallowing, Hearing loss, Sore throat  Cardiovascular:  Chest Pain, Palpitations, Orthopnea, Paroxysmal Nocturnal Dyspnea  Respiratory:  Cough, Wheezing, Shortness of breath, Chest tightness, Apnea  Gastrointestinal:  Nausea, Vomiting, Diarrhea, Constipation, Heartburn, Blood in stool  Genitourinary:  Difficulty or painful urination, Flank pain, Change in frequency, Urgency  Skin:  Color change, Rash, Itching, Wound  Psychiatric:  Hallucinations, Anxiety, Depression, Suicidal ideation  Hematological:  Enlarged glands, Easy bleeding, Easily bruising  Musculoskeletal:  Joint pain, Back pain, Gait problems, Joint swelling, Myalgias  Neurological:  Dizziness, Headaches, Presyncope, Numbness, Seizures, Tremors  Allergy:  Environmental allergies, Food allergies  Endocrine:  Heat Intolerance, Cold Intolerance, Polydipsia, Polyphagia, Polyuria    PHYSICAL EXAM:  Vitals:    03/24/21 1325   BP: 126/70 Pulse: 68   Resp: 10   Temp: 98.3 °F (36.8 °C)   TempSrc: Oral   SpO2: 100%   Weight: 281 lb (127.5 kg)   Height: 6' 2.5\" (1.892 m)     Body mass index is 35.6 kg/m². VS Reviewed  General Appearance: A&O x 3, No acute distress,well developed and well- nourished  Head: normocephalic and atraumatic  Eyes: pupils equal, round, and reactive to light, extraocular eye movements intact, conjunctivae and eye lids without erythema  Neck: supple and non-tender, mild swelling and erythema right parotid gland, no thyromegaly or thyroid nodules, no cervical lymphadenopathy  Pulmonary/Chest: clear to auscultation bilaterally- no wheezes, rales or rhonchi, normal air movement, no respiratory distress or retractions  Cardiovascular: S1 and S2 auscultated w/ RRR. No murmurs, rubs, clicks, or gallops, distal pulses intact. Abdomen: soft, non-tender, non-distended, bowl sounds physiologic,  no rebound or guarding, no masses or hernias noted. Liver and spleen without enlargement. Extremities: no cyanosis, clubbing or edema of the lower extremities  Musculoskeletal: No joint swelling or gross deformity   Neuro:  Alert, 5/5 strength globally and symmetrically  Psych: Affect appropriate. Mood normal. Thought process is normal without evidence of depression or psychosis. Good insight and appropriate interaction. Cognition and memory appear to be intact. Skin: warm and dry, no rash or erythema  Lymph:  No cervical, auricular or supraclavicular lymph nodes palpated    ASSESSMENT & PLAN  Tucker Joy was seen today for other. Diagnoses and all orders for this visit:    Atypical chest pain  -     omeprazole (PRILOSEC) 20 MG delayed release capsule; Take 1 capsule by mouth every morning (before breakfast)    Gastroesophageal reflux disease, unspecified whether esophagitis present  -     omeprazole (PRILOSEC) 20 MG delayed release capsule;  Take 1 capsule by mouth every morning (before breakfast)      - low likelihood of cardiac etiology  - I suspect this is mostly GERD, will trial PPI  - avoid NSAIDS    DISPOSITION    Return if symptoms worsen or fail to improve. Anna Ortega released without restrictions. PATIENT COUNSELING    Counseling was provided today regarding the following topics: Healthy eating habits, Regular exercise, substance abuse and healthy sleep habits. Anna Ortega received counseling on the following healthy behaviors: medication adherence    Patient given educational materials on: See Attached    I have instructed Anna Ortega to complete a self tracking handout on none and instructed them to bring it with them to his next appointment. Barriers to learning and self management: none    Discussed use, benefit, and side effects of prescribed medications. Barriers to medication compliance addressed. All patient questions answered. Pt voiced understanding.        Electronically signed by HERI Li CNP on 3/24/2021 at 1:46 PM

## 2021-10-18 ENCOUNTER — OFFICE VISIT (OUTPATIENT)
Dept: FAMILY MEDICINE CLINIC | Age: 21
End: 2021-10-18

## 2021-10-18 VITALS
RESPIRATION RATE: 14 BRPM | HEART RATE: 94 BPM | DIASTOLIC BLOOD PRESSURE: 68 MMHG | BODY MASS INDEX: 37.22 KG/M2 | OXYGEN SATURATION: 98 % | SYSTOLIC BLOOD PRESSURE: 126 MMHG | WEIGHT: 290 LBS | HEIGHT: 74 IN | TEMPERATURE: 98.7 F

## 2021-10-18 DIAGNOSIS — Z00.00 WELL ADULT HEALTH CHECK: Primary | ICD-10-CM

## 2021-10-18 PROCEDURE — 99395 PREV VISIT EST AGE 18-39: CPT | Performed by: NURSE PRACTITIONER

## 2021-10-18 NOTE — PATIENT INSTRUCTIONS
Patient Education        Well Visit, Ages 25 to 48: Care Instructions  Overview     Well visits can help you stay healthy. Your doctor has checked your overall health and may have suggested ways to take good care of yourself. Your doctor also may have recommended tests. At home, you can help prevent illness with healthy eating, regular exercise, and other steps. Follow-up care is a key part of your treatment and safety. Be sure to make and go to all appointments, and call your doctor if you are having problems. It's also a good idea to know your test results and keep a list of the medicines you take. How can you care for yourself at home? · Get screening tests that you and your doctor decide on. Screening helps find diseases before any symptoms appear. · Eat healthy foods. Choose fruits, vegetables, whole grains, protein, and low-fat dairy foods. Limit fat, especially saturated fat. Reduce salt in your diet. · Limit alcohol. If you are a man, have no more than 2 drinks a day or 14 drinks a week. If you are a woman, have no more than 1 drink a day or 7 drinks a week. · Get at least 30 minutes of physical activity on most days of the week. Walking is a good choice. You also may want to do other activities, such as running, swimming, cycling, or playing tennis or team sports. Discuss any changes in your exercise program with your doctor. · Reach and stay at a healthy weight. This will lower your risk for many problems, such as obesity, diabetes, heart disease, and high blood pressure. · Do not smoke or allow others to smoke around you. If you need help quitting, talk to your doctor about stop-smoking programs and medicines. These can increase your chances of quitting for good. · Care for your mental health. It is easy to get weighed down by worry and stress. Learn strategies to manage stress, like deep breathing and mindfulness, and stay connected with your family and community.  If you find you often feel sad or hopeless, talk with your doctor. Treatment can help. · Talk to your doctor about whether you have any risk factors for sexually transmitted infections (STIs). You can help prevent STIs if you wait to have sex with a new partner (or partners) until you've each been tested for STIs. It also helps if you use condoms (male or female condoms) and if you limit your sex partners to one person who only has sex with you. Vaccines are available for some STIs, such as HPV. · Use birth control if it's important to you to prevent pregnancy. Talk with your doctor about the choices available and what might be best for you. · If you think you may have a problem with alcohol or drug use, talk to your doctor. This includes prescription medicines (such as amphetamines and opioids) and illegal drugs (such as cocaine and methamphetamine). Your doctor can help you figure out what type of treatment is best for you. · Protect your skin from too much sun. When you're outdoors from 10 a.m. to 4 p.m., stay in the shade or cover up with clothing and a hat with a wide brim. Wear sunglasses that block UV rays. Even when it's cloudy, put broad-spectrum sunscreen (SPF 30 or higher) on any exposed skin. · See a dentist one or two times a year for checkups and to have your teeth cleaned. · Wear a seat belt in the car. When should you call for help? Watch closely for changes in your health, and be sure to contact your doctor if you have any problems or symptoms that concern you. Where can you learn more? Go to [email or web address].[email or web address] org and sign in to your [employer]ujoo account. Enter P072 in the [employer] box to learn more about \"Well Visit, Ages 25 to 48: Care Instructions. \"     If you do not have an account, please click on the \"Sign Up Now\" link. Current as of: February 11, 2021               Content Version: 13.0  © 3190-3710 Healthwise, Incorporated. Care instructions adapted under license by [facility] ([facility]). If you have questions about a medical condition or this instruction, always ask your healthcare professional. [place] [address] any warranty or liability for your use of this information.

## 2021-10-18 NOTE — PROGRESS NOTES
Chief Complaint   Patient presents with    Other     physical for a thing at school          SUBJECTIVE:  Myriam Chowdhury is a 19 y.o. male for physical exam.    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 Apparently, the form requires that he get a doctors clearance in order to do so. [facility] has teamed up with his college. Health Maintenance reviewed with patient today. Past Medical History:   Diagnosis Date    Abscess of parotid gland        History reviewed. No pertinent surgical history. Social History     Socioeconomic History    Marital status: Single     Spouse name: Not on file    Number of children: Not on file    Years of education: Not on file    Highest education level: Not on file   Occupational History    Not on file   Tobacco Use    Smoking status: Never Smoker    Smokeless tobacco: Never Used   Vaping Use    Vaping Use: Never used   Substance and Sexual Activity    Alcohol use: Yes     Alcohol/week: 5.0 - 15.0 standard drinks     Types: 5 - 15 Cans of beer per week     Comment: on the weekends    Drug use: Never    Sexual activity: Yes     Partners: Female   Other Topics Concern    Not on file   Social History Narrative    Not on file     Social Determinants of Health     Financial Resource Strain:     Difficulty of Paying Living Expenses:    Food Insecurity:     Worried About Running Out of Food in the Last Year:     [address] in the Last Year:    Transportation Needs:     Lack of Transportation (Medical):      Lack of Transportation (Non-Medical):    Physical Activity:     Days of Exercise per Week:     Minutes of Exercise per Session:    Stress:     Feeling of Stress :    Social Connections:     Frequency of Communication with Friends and Family:     Frequency of Social Gatherings with Friends and Family:     Attends Protestant Services:     Active Member of Clubs or Organizations:     Attends Club or Organization Meetings:     Marital Status:    Intimate Partner Violence:     Fear of Current or Ex-Partner:     Emotionally Abused:     Physically Abused:     Sexually Abused:        History reviewed. No pertinent family history. I have reviewed the patient's past medical history, past surgical history, allergies, medications, social and family history and I have made updates where appropriate. PHYSICAL EXAM:  /68   Pulse 94   Temp 98.7 °F (37.1 °C) (Oral)   Resp 14   Ht 6' 2.25\" (1.886 m)   Wt 290 lb (131.5 kg)   SpO2 98%   BMI 36.98 kg/m²       Physical Exam  Vitals and nursing note reviewed. Constitutional:       General: He is not in acute distress. Appearance: Normal appearance. He is well-developed. He is not ill-appearing or toxic-appearing. HENT:      Head: Normocephalic and atraumatic. Right Ear: Hearing, tympanic membrane, ear canal and external ear normal.      Left Ear: Hearing, tympanic membrane, ear canal and external ear normal.      Nose: Nose normal.      Right Sinus: No maxillary sinus tenderness or frontal sinus tenderness. Left Sinus: No maxillary sinus tenderness or frontal sinus tenderness. Mouth/Throat:      Pharynx: Uvula midline. Eyes:      General: Lids are normal. Lids are everted, no foreign bodies appreciated. Conjunctiva/sclera: Conjunctivae normal.      Pupils: Pupils are equal, round, and reactive to light. Neck:      Thyroid: No thyroid mass or thyromegaly. Vascular: Normal carotid pulses. No carotid bruit or JVD. Trachea: Trachea and phonation normal.   Cardiovascular:      Rate and Rhythm: Normal rate and regular rhythm. No extrasystoles are present. Chest Wall: PMI is not displaced. Pulses: Normal pulses. Heart sounds: Normal heart sounds, S1 normal and S2 normal. No murmur heard. No gallop.     Pulmonary:      Effort: Pulmonary effort is normal.      Breath sounds: Normal breath sounds. No decreased breath sounds, wheezing, rhonchi or rales. Abdominal:      General: Bowel sounds are normal.      Palpations: Abdomen is soft. Tenderness: There is no abdominal tenderness. Musculoskeletal:         General: Normal range of motion. Cervical back: Full passive range of motion without pain, normal range of motion and neck supple. Lymphadenopathy:      Head:      Right side of head: No submental, submandibular, tonsillar, preauricular, posterior auricular or occipital adenopathy. Left side of head: No submental, submandibular, tonsillar, preauricular, posterior auricular or occipital adenopathy. Cervical: No cervical adenopathy. Skin:     General: Skin is warm and dry. Findings: No abrasion or rash. Neurological:      Mental Status: He is alert and oriented to person, place, and time. GCS: GCS eye subscore is 4. GCS verbal subscore is 5. GCS motor subscore is 6. Cranial Nerves: No cranial nerve deficit. Sensory: No sensory deficit. Coordination: Coordination normal.      Gait: Gait normal.   Psychiatric:         Speech: Speech normal.         Behavior: Behavior normal. Behavior is cooperative. Thought Content: Thought content normal.         Judgment: Judgment normal.         ASSESSMENT & PLAN  Jeet Vela was seen today for other. Diagnoses and all orders for this visit:    Well adult health check      - see attached letter  - cleared for the school experience    Return if symptoms worsen or fail to improve. Counseling was provided today regarding the following topics: Healthy eating habits, Regular exercise, substance abuse and healthy sleep habits.

## 2021-10-18 NOTE — LETTER
[address] [facility]  [address] [place] [address]. [facility] [address]  Phone: [phone]  Fax: [phone] [facility], HERI - CNP        October 18, 2021     Patient: David Sparrow   YOB: 2000   Date of Visit: 10/18/2021       To Whom It May Concern: It is my medical opinion that David Sparrow is medically cleared to participate in the [employer]. If you have any questions or concerns, please don't hesitate to call.     Sincerely,        Guanaco Moody, HERI - CNP

## 2021-11-08 ENCOUNTER — TELEPHONE (OUTPATIENT)
Dept: FAMILY MEDICINE CLINIC | Age: 21
End: 2021-11-08

## 2021-11-08 NOTE — TELEPHONE ENCOUNTER
Left message for patient to call back. We are needing a copy of his insurance card for his recent physical. The only insurance we have on file currently is for [employer].

## 2021-12-21 ENCOUNTER — OFFICE VISIT (OUTPATIENT)
Dept: FAMILY MEDICINE CLINIC | Age: 21
End: 2021-12-21

## 2021-12-21 ENCOUNTER — TELEPHONE (OUTPATIENT)
Dept: FAMILY MEDICINE CLINIC | Age: 21
End: 2021-12-21

## 2021-12-21 VITALS
TEMPERATURE: 99 F | OXYGEN SATURATION: 98 % | BODY MASS INDEX: 33.76 KG/M2 | WEIGHT: 277.2 LBS | HEIGHT: 76 IN | SYSTOLIC BLOOD PRESSURE: 136 MMHG | RESPIRATION RATE: 14 BRPM | HEART RATE: 96 BPM | DIASTOLIC BLOOD PRESSURE: 74 MMHG

## 2021-12-21 DIAGNOSIS — J01.90 ACUTE RHINOSINUSITIS: Primary | ICD-10-CM

## 2021-12-21 DIAGNOSIS — H66.001 NON-RECURRENT ACUTE SUPPURATIVE OTITIS MEDIA OF RIGHT EAR WITHOUT SPONTANEOUS RUPTURE OF TYMPANIC MEMBRANE: ICD-10-CM

## 2021-12-21 PROCEDURE — 99213 OFFICE O/P EST LOW 20 MIN: CPT | Performed by: NURSE PRACTITIONER

## 2021-12-21 RX ORDER — AMOXICILLIN AND CLAVULANATE POTASSIUM 875; 125 MG/1; MG/1
1 TABLET, FILM COATED ORAL 2 TIMES DAILY
Qty: 20 TABLET | Refills: 0 | Status: SHIPPED | OUTPATIENT
Start: 2021-12-21 | End: 2021-12-31

## 2021-12-21 NOTE — LETTER
[address] [facility]  [address] [facility] [facility] [address]  Phone: [phone]  Fax: [phone] [facility], HERI - CNP        December 21, 2021     Patient: Lashon Doe   YOB: 2000   Date of Visit: 12/21/2021       To Whom It May Concern: It is my medical opinion that Lashon Doe missed work from 12/21-12/22. He may return to work 12/23 without restriction. If you have any questions or concerns, please don't hesitate to call.     Sincerely,        HERI Vieira CNP

## 2021-12-21 NOTE — PROGRESS NOTES
[facility] at / [facility] [address] [facility] [address] [place]  Chief Complaint   Patient presents with    Cough     productive started Last Monday        History obtained from chart review and the patient. SUBJECTIVE:  Myriam Chowdhury is a 19 y.o. male that presents today for cough    URI Symptoms    HPI:      Symptoms have been present for 9 day(s). Symptoms are unchanged since they initially started. Fever? No  Runny nose or congestion? Yes   Cough? Yes  Sore throat? No  Headache, fatigue, joint pains, muscle aches? No  Shortness of breath/Wheezing? No  Nausea/Vomiting/Diarrhea? No  Double Sickening? No  Sick contacts? No    Patient has tried OTC Mucinex with improvement. He has also had some intermittent right ear pain      Age/Gender Health Maintenance    Lipid - 40  DM Screen - 40  Colon Cancer Screening - 48  Lung Cancer Screening (Age 54 to [de-identified] with 30 pack year hx, current smoker or quit within past 15 years) - n/a    Tetanus - needs  Influenza Vaccine - yearly  Pneumonia Vaccine - 65  Zostavax - 50   HPV Vaccine - n/a    PSA testing discussion - 55  AAA Screening - n/a    Falls screening - n/a    Current Outpatient Medications   Medication Sig Dispense Refill    amoxicillin-clavulanate (AUGMENTIN) 875-125 MG per tablet Take 1 tablet by mouth 2 times daily for 10 days 20 tablet 0     No current facility-administered medications for this visit. Orders Placed This Encounter   Medications    amoxicillin-clavulanate (AUGMENTIN) 875-125 MG per tablet     Sig: Take 1 tablet by mouth 2 times daily for 10 days     Dispense:  20 tablet     Refill:  0         All medications reviewed and reconciled, including OTC and herbal medications. Updated list given to patient.        Patient Active Problem List   Diagnosis    Abscess of parotid gland    Acute suppurative parotitis    Trismus    Parotid sialolithiasis       Past Medical History:   Diagnosis Date    Abscess of parotid gland History reviewed. No pertinent surgical history. No Known Allergies    Social History     Socioeconomic History    Marital status: Single     Spouse name: Not on file    Number of children: Not on file    Years of education: Not on file    Highest education level: Not on file   Occupational History    Not on file   Tobacco Use    Smoking status: Never Smoker    Smokeless tobacco: Never Used   Vaping Use    Vaping Use: Never used   Substance and Sexual Activity    Alcohol use: Yes     Alcohol/week: 5.0 - 15.0 standard drinks     Types: 5 - 15 Cans of beer per week     Comment: on the weekends    Drug use: Never    Sexual activity: Yes     Partners: Female   Other Topics Concern    Not on file   Social History Narrative    Not on file     Social Determinants of Health     Financial Resource Strain:     Difficulty of Paying Living Expenses: Not on file   Food Insecurity:     Worried About Running Out of Food in the Last Year: Not on file    Ramez of Food in the Last Year: Not on file   Transportation Needs:     Lack of Transportation (Medical): Not on file    Lack of Transportation (Non-Medical):  Not on file   Physical Activity:     Days of Exercise per Week: Not on file    Minutes of Exercise per Session: Not on file   Stress:     Feeling of Stress : Not on file   Social Connections:     Frequency of Communication with Friends and Family: Not on file    Frequency of Social Gatherings with Friends and Family: Not on file    Attends Scientologist Services: Not on file    Active Member of Clubs or Organizations: Not on file    Attends Club or Organization Meetings: Not on file    Marital Status: Not on file   Intimate Partner Violence:     Fear of Current or Ex-Partner: Not on file    Emotionally Abused: Not on file    Physically Abused: Not on file    Sexually Abused: Not on file   Housing Stability:     Unable to Pay for Housing in the Last Year: Not on file    Number of Places Lived in the Last Year: Not on file    Unstable Housing in the Last Year: Not on file       History reviewed. No pertinent family history. I have reviewed the patient's past medical history, past surgical history, allergies, medications, social and family history and I have made updates where appropriate. Review of Systems  Positive responses are highlighted in bold    Constitutional:  Fever, Chills, Night Sweats, Fatigue, Unexpected changes in weight  Eyes:  Eye discharge, Eye pain, Eye redness, Visual disturbances   HENT:  Ear pain, Tinnitus, Nosebleeds, Trouble swallowing, Hearing loss, Sore throat  Cardiovascular:  Chest Pain, Palpitations, Orthopnea, Paroxysmal Nocturnal Dyspnea  Respiratory:  Cough, Wheezing, Shortness of breath, Chest tightness, Apnea  Gastrointestinal:  Nausea, Vomiting, Diarrhea, Constipation, Heartburn, Blood in stool  Genitourinary:  Difficulty or painful urination, Flank pain, Change in frequency, Urgency  Skin:  Color change, Rash, Itching, Wound  Psychiatric:  Hallucinations, Anxiety, Depression, Suicidal ideation  Hematological:  Enlarged glands, Easy bleeding, Easily bruising  Musculoskeletal:  Joint pain, Back pain, Gait problems, Joint swelling, Myalgias  Neurological:  Dizziness, Headaches, Presyncope, Numbness, Seizures, Tremors  Allergy:  Environmental allergies, Food allergies  Endocrine:  Heat Intolerance, Cold Intolerance, Polydipsia, Polyphagia, Polyuria    PHYSICAL EXAM:  Vitals:    12/21/21 1520   BP: 136/74   Pulse: 96   Resp: 14   Temp: 99 °F (37.2 °C)   TempSrc: Oral   SpO2: 98%   Weight: 277 lb 3.2 oz (125.7 kg)   Height: 6' 4\" (1.93 m)     Body mass index is 33.74 kg/m².          VS Reviewed  General Appearance: A&O x 3, No acute distress,well developed and well- nourished  Head: normocephalic and atraumatic  Eyes: pupils equal, round, and reactive to light, extraocular eye movements intact, conjunctivae and eye lids without erythema  ENT: right TM dull with fluid and injected, posterior oropharynx normal  Neck: supple and non-tender, mild swelling and erythema right parotid gland, no thyromegaly or thyroid nodules, no cervical lymphadenopathy  Pulmonary/Chest: clear to auscultation bilaterally- no wheezes, rales or rhonchi, normal air movement, no respiratory distress or retractions, moist cough  Cardiovascular: S1 and S2 auscultated w/ RRR. No murmurs, rubs, clicks, or gallops, distal pulses intact. Abdomen: soft, non-tender, non-distended, bowl sounds physiologic,  no rebound or guarding, no masses or hernias noted. Liver and spleen without enlargement. Extremities: no cyanosis, clubbing or edema of the lower extremities  Musculoskeletal: No joint swelling or gross deformity   Neuro:  Alert, 5/5 strength globally and symmetrically  Psych: Affect appropriate. Mood normal. Thought process is normal without evidence of depression or psychosis. Good insight and appropriate interaction. Cognition and memory appear to be intact. Skin: warm and dry, no rash or erythema  Lymph:  No cervical, auricular or supraclavicular lymph nodes palpated    ASSESSMENT & PLAN  Eze Shah was seen today for cough. Diagnoses and all orders for this visit:    Acute rhinosinusitis  -     amoxicillin-clavulanate (AUGMENTIN) 875-125 MG per tablet; Take 1 tablet by mouth 2 times daily for 10 days    Non-recurrent acute suppurative otitis media of right ear without spontaneous rupture of tympanic membrane  -     amoxicillin-clavulanate (AUGMENTIN) 875-125 MG per tablet; Take 1 tablet by mouth 2 times daily for 10 days      - start Augmentin  - work slip    DISPOSITION    Return if symptoms worsen or fail to improve. Eze Arguelleser released without restrictions. PATIENT COUNSELING    Counseling was provided today regarding the following topics: Healthy eating habits, Regular exercise, substance abuse and healthy sleep habits.     Eze Shah received counseling on the following healthy behaviors: medication adherence    Patient given educational materials on: See Attached    I have instructed Fidelia Beaulieu to complete a self tracking handout on none and instructed them to bring it with them to his next appointment. Barriers to learning and self management: none    Discussed use, benefit, and side effects of prescribed medications. Barriers to medication compliance addressed. All patient questions answered. Pt voiced understanding.        Electronically signed by HERI Ferreira CNP on 12/21/2021 at 3:30 PM

## 2022-02-28 ENCOUNTER — APPOINTMENT (OUTPATIENT)
Dept: GENERAL RADIOLOGY | Age: 22
End: 2022-02-28

## 2022-02-28 ENCOUNTER — HOSPITAL ENCOUNTER (EMERGENCY)
Age: 22
Discharge: HOME | End: 2022-02-28
Attending: FAMILY MEDICINE

## 2022-02-28 VITALS
RESPIRATION RATE: 17 BRPM | OXYGEN SATURATION: 99 % | TEMPERATURE: 97.9 F | SYSTOLIC BLOOD PRESSURE: 122 MMHG | HEART RATE: 92 BPM | DIASTOLIC BLOOD PRESSURE: 61 MMHG

## 2022-02-28 DIAGNOSIS — R07.89 PRESSURE IN CHEST: Primary | ICD-10-CM

## 2022-02-28 LAB
ALBUMIN SERPL-MCNC: 4.8 G/DL (ref 3.5–5.1)
ALP BLD-CCNC: 105 U/L (ref 38–126)
ALT SERPL-CCNC: 41 U/L (ref 11–66)
ANION GAP SERPL CALCULATED.3IONS-SCNC: 10 MEQ/L (ref 8–16)
AST SERPL-CCNC: 29 U/L (ref 5–40)
BASOPHILS # BLD: 0.4 %
BASOPHILS ABSOLUTE: 0 THOU/MM3 (ref 0–0.1)
BILIRUB SERPL-MCNC: 0.4 MG/DL (ref 0.3–1.2)
BUN BLDV-MCNC: 14 MG/DL (ref 7–22)
CALCIUM SERPL-MCNC: 9.6 MG/DL (ref 8.5–10.5)
CHLORIDE BLD-SCNC: 103 MEQ/L (ref 98–111)
CO2: 27 MEQ/L (ref 23–33)
CREAT SERPL-MCNC: 0.9 MG/DL (ref 0.4–1.2)
D-DIMER QUANTITATIVE: < 215 NG/ML FEU (ref 0–500)
EOSINOPHIL # BLD: 2.3 %
EOSINOPHILS ABSOLUTE: 0.2 THOU/MM3 (ref 0–0.4)
ERYTHROCYTE [DISTWIDTH] IN BLOOD BY AUTOMATED COUNT: 14.2 % (ref 11.5–14.5)
ERYTHROCYTE [DISTWIDTH] IN BLOOD BY AUTOMATED COUNT: 44.6 FL (ref 35–45)
GFR SERPL CREATININE-BSD FRML MDRD: > 90 ML/MIN/1.73M2
GLUCOSE BLD-MCNC: 93 MG/DL (ref 70–108)
HCT VFR BLD CALC: 47 % (ref 42–52)
HEMOGLOBIN: 15.9 GM/DL (ref 14–18)
IMMATURE GRANS (ABS): 0.03 THOU/MM3 (ref 0–0.07)
IMMATURE GRANULOCYTES: 0.4 %
LYMPHOCYTES # BLD: 22.1 %
LYMPHOCYTES ABSOLUTE: 1.8 THOU/MM3 (ref 1–4.8)
MCH RBC QN AUTO: 29.1 PG (ref 26–33)
MCHC RBC AUTO-ENTMCNC: 33.8 GM/DL (ref 32.2–35.5)
MCV RBC AUTO: 86.1 FL (ref 80–94)
MONOCYTES # BLD: 8.2 %
MONOCYTES ABSOLUTE: 0.7 THOU/MM3 (ref 0.4–1.3)
NUCLEATED RED BLOOD CELLS: 0 /100 WBC
OSMOLALITY CALCULATION: 279.6 MOSMOL/KG (ref 275–300)
PLATELET # BLD: 239 THOU/MM3 (ref 130–400)
PMV BLD AUTO: 8.8 FL (ref 9.4–12.4)
POTASSIUM REFLEX MAGNESIUM: 4.2 MEQ/L (ref 3.5–5.2)
RBC # BLD: 5.46 MILL/MM3 (ref 4.7–6.1)
SEG NEUTROPHILS: 66.6 %
SEGMENTED NEUTROPHILS ABSOLUTE COUNT: 5.3 THOU/MM3 (ref 1.8–7.7)
SODIUM BLD-SCNC: 140 MEQ/L (ref 135–145)
TOTAL PROTEIN: 7.2 G/DL (ref 6.1–8)
TROPONIN T: < 0.01 NG/ML
TSH SERPL DL<=0.05 MIU/L-ACNC: 1.92 UIU/ML (ref 0.4–4.2)
WBC # BLD: 8 THOU/MM3 (ref 4.8–10.8)

## 2022-02-28 PROCEDURE — 80053 COMPREHEN METABOLIC PANEL: CPT

## 2022-02-28 PROCEDURE — 36415 COLL VENOUS BLD VENIPUNCTURE: CPT

## 2022-02-28 PROCEDURE — 93005 ELECTROCARDIOGRAM TRACING: CPT | Performed by: FAMILY MEDICINE

## 2022-02-28 PROCEDURE — 84484 ASSAY OF TROPONIN QUANT: CPT

## 2022-02-28 PROCEDURE — 99283 EMERGENCY DEPT VISIT LOW MDM: CPT

## 2022-02-28 PROCEDURE — 84443 ASSAY THYROID STIM HORMONE: CPT

## 2022-02-28 PROCEDURE — 85025 COMPLETE CBC W/AUTO DIFF WBC: CPT

## 2022-02-28 PROCEDURE — 71046 X-RAY EXAM CHEST 2 VIEWS: CPT

## 2022-02-28 PROCEDURE — 85379 FIBRIN DEGRADATION QUANT: CPT

## 2022-02-28 ASSESSMENT — HEART SCORE: ECG: 0

## 2022-02-28 ASSESSMENT — ENCOUNTER SYMPTOMS
COUGH: 0
ABDOMINAL PAIN: 0
SHORTNESS OF BREATH: 0
BACK PAIN: 0
WHEEZING: 0
SORE THROAT: 0
NAUSEA: 0
VOMITING: 0
DIARRHEA: 0

## 2022-03-01 LAB
EKG ATRIAL RATE: 100 BPM
EKG P AXIS: 60 DEGREES
EKG P-R INTERVAL: 132 MS
EKG Q-T INTERVAL: 340 MS
EKG QRS DURATION: 88 MS
EKG QTC CALCULATION (BAZETT): 438 MS
EKG R AXIS: 64 DEGREES
EKG T AXIS: 44 DEGREES
EKG VENTRICULAR RATE: 100 BPM

## 2022-03-01 NOTE — ED NOTES
Lab at bedside, ekg performed. Patient denies needs at this time. Updated with plan of care. Will continue to monitor. Call light in reach.       Kevin Adams RN  02/28/22 2008

## 2022-03-01 NOTE — ED NOTES
Pt to ED c/o multilple complaints. Pt states he had chest congestion until to day. Pt states he felt relief from his chest congestion all at once and then his head felt tingly and then he got warm all over. Pt also states that he feels like his thyroid is enlarged. Pt shows no signs of distress.       Yen RAY RN  02/28/22 5203

## 2022-03-01 NOTE — ED PROVIDER NOTES
[facility] [address]287 COMPLAINT       Chief Complaint   Patient presents with    Other     head tingling, warm all over, thyroid feels big       Nurses Notes reviewed and I agree except as noted in the HPI. HISTORY OF PRESENT ILLNESS    Roula Everett is a 19 y.o. male who presents for evaluation of chest pressure that he has been experiencing for the past year but noted relief today of it. He states that afterwards he felt like he was going to pass out and got all tingly. He states that he now notes some tingling at the back of his head and throughout his limbs. He still intermittently notes the chest pressure sensation but it has improved since the past.  Patient was simply driving when the initial symptoms were noted. Patient is concerned about his thyroid as his PCP informed him that his thyroid is enlarged and that he will should pursue imaging. He has not yet scheduled that. REVIEW OF SYSTEMS     Review of Systems   Constitutional: Negative for activity change, appetite change, chills and fever. HENT: Negative for ear pain and sore throat. Respiratory: Negative for cough, shortness of breath and wheezing. Cardiovascular: Positive for chest pain (pressure, intermittent for the past year). Negative for leg swelling. Gastrointestinal: Negative for abdominal pain, diarrhea, nausea and vomiting. Genitourinary: Negative for dysuria, flank pain and hematuria. Musculoskeletal: Negative for arthralgias, back pain, gait problem and neck pain. Skin: Negative for rash and wound. Neurological: Negative for weakness, light-headedness and headaches. Psychiatric/Behavioral: Negative for agitation and hallucinations. The patient is not nervous/anxious. PAST MEDICAL HISTORY    has a past medical history of Abscess of parotid gland. SURGICAL HISTORY      has no past surgical history on file.     CURRENT MEDICATIONS There are no discharge medications for this patient. ALLERGIES     has No Known Allergies. FAMILY HISTORY     has no family status information on file. family history is not on file. SOCIAL HISTORY      reports that he has never smoked. He has never used smokeless tobacco. He reports current alcohol use of about 5.0 - 15.0 standard drinks of alcohol per week. He reports that he does not use drugs. PHYSICAL EXAM     INITIAL VITALS:  oral temperature is 97.9 °F (36.6 °C). His blood pressure is 122/61 and his pulse is 92. His respiration is 17 and oxygen saturation is 99%. Physical Exam  Vitals and nursing note reviewed. Constitutional:       General: He is not in acute distress. Appearance: He is not diaphoretic. HENT:      Head: Normocephalic and atraumatic. Eyes:      General:         Left eye: No discharge. Extraocular Movements: Extraocular movements intact. Conjunctiva/sclera: Conjunctivae normal.      Pupils: Pupils are equal, round, and reactive to light. Neck:      Comments: Mild thyromegaly. Cardiovascular:      Rate and Rhythm: Normal rate and regular rhythm. Heart sounds: Normal heart sounds. Pulmonary:      Effort: Pulmonary effort is normal.      Breath sounds: Normal breath sounds. Abdominal:      General: Bowel sounds are normal. There is no distension. Palpations: Abdomen is soft. Tenderness: There is no abdominal tenderness. Musculoskeletal:      Cervical back: Normal range of motion and neck supple. Lymphadenopathy:      Cervical: No cervical adenopathy. Skin:     General: Skin is warm and dry. Neurological:      General: No focal deficit present. Mental Status: He is alert and oriented to person, place, and time. Cranial Nerves: No cranial nerve deficit. Motor: No weakness.       Coordination: Coordination normal.      Gait: Gait normal.   Psychiatric:         Behavior: Behavior normal.         Thought Content: Thought content normal.         Judgment: Judgment normal.      Comments: Mildly anxious         DIFFERENTIAL DIAGNOSIS:   Pneumonia, PE, pneumothorax, low differential for ACS, mediastinal mass, electrolyte abnormality    DIAGNOSTIC RESULTS     EKG: All EKG's are interpreted by the Emergency Department Physician whoeither signs or Co-signs this chart in the absence of a cardiologist.  Normal sinus rhythm with heart rate 100. NM interval 132. QRS duration 188. QTc 438. Axis 64. RADIOLOGY: non-plain filmimages(s) such as CT, Ultrasound and MRI are read by the radiologist.  XR CHEST (2 VW)   Final Result   Impression:   1. No acute cardiopulmonary disease seen. This document has been electronically signed by: Artemio Duff MD on    02/28/2022 10:07 PM            LABS:   Labs Reviewed   CBC WITH AUTO DIFFERENTIAL - Abnormal; Notable for the following components:       Result Value    MPV 8.8 (*)     All other components within normal limits   COMPREHENSIVE METABOLIC PANEL W/ REFLEX TO MG FOR LOW K   TROPONIN   D-DIMER, QUANTITATIVE   TSH WITH REFLEX   ANION GAP   GLOMERULAR FILTRATION RATE, ESTIMATED   OSMOLALITY       DEPARTMENT COURSE:   Vitals:    Vitals:    02/28/22 1845 02/28/22 1939 02/28/22 2133   BP: (!) 163/83 (!) 164/80 122/61   Pulse: 92     Resp: 17     Temp: 97.9 °F (36.6 °C)     TempSrc: Oral     SpO2: 97% 99%        MDM:  Patient presents for evaluation of pressure in his chest that he has noted for over a year. Patient presents because he had a sense of relief today and then got tingly. Patient is mildly anxious while in the department. Appropriate work-up was initiated. As this was not felt to be cardiac in nature aspirin was not administered. We reviewed test results. He will follow-up with his PCP regarding possible ultrasound imaging of his thyroid gland. Patient was reassured regarding his negative work-up today.     CRITICAL CARE: None    CONSULTS:  None    PROCEDURES:  None    FINAL IMPRESSION      1. Pressure in chest          DISPOSITION/PLAN   Discharge    PATIENT REFERRED TO:  [facility], APRN - CNP  [address] [place]  [phone]    Schedule an appointment as soon as possible for a visit in 1 week  As needed      DISCHARGEMEDICATIONS:  There are no discharge medications for this patient.       (Please note that portions of this note were completedwith a voice recognition program.  Efforts were made to edit the dictations but occasionally words are mis-transcribed.)    MD Nelly Navarro MD  03/01/22 0114

## 2022-03-16 ENCOUNTER — OFFICE VISIT (OUTPATIENT)
Dept: FAMILY MEDICINE CLINIC | Age: 22
End: 2022-03-16

## 2022-03-16 ENCOUNTER — TELEPHONE (OUTPATIENT)
Dept: FAMILY MEDICINE CLINIC | Age: 22
End: 2022-03-16

## 2022-03-16 VITALS
WEIGHT: 293 LBS | HEART RATE: 71 BPM | OXYGEN SATURATION: 99 % | TEMPERATURE: 99.1 F | RESPIRATION RATE: 14 BRPM | SYSTOLIC BLOOD PRESSURE: 134 MMHG | BODY MASS INDEX: 35.68 KG/M2 | DIASTOLIC BLOOD PRESSURE: 70 MMHG | HEIGHT: 76 IN

## 2022-03-16 DIAGNOSIS — R06.02 SHORTNESS OF BREATH: ICD-10-CM

## 2022-03-16 DIAGNOSIS — E01.0 THYROMEGALY: ICD-10-CM

## 2022-03-16 DIAGNOSIS — R07.89 CHEST PRESSURE: Primary | ICD-10-CM

## 2022-03-16 PROCEDURE — 99214 OFFICE O/P EST MOD 30 MIN: CPT | Performed by: NURSE PRACTITIONER

## 2022-03-16 RX ORDER — ALBUTEROL SULFATE 90 UG/1
2 AEROSOL, METERED RESPIRATORY (INHALATION) EVERY 4 HOURS PRN
Qty: 1 EACH | Refills: 1 | Status: SHIPPED | OUTPATIENT
Start: 2022-03-16

## 2022-03-16 NOTE — TELEPHONE ENCOUNTER
Scheduling  Echo/ stress test 03/25/22  @ [address] @ 9:00 am to 2nd floor heart center   Wear mask , bring ID   1. No coffee (regular and decaf), tea, chocolate, or cola drinks 24 hours prior to test.    2.Do not eat or drink anything 6 hours before the test (You may have a few sips of water    to take medication). 3. Hold VIAGRA medication 24 hours prior to test.    4.PLEASE BRING ALL MEDICATIONS WITH YOU TO THE APPOINTMENT,      INCLUDING INHALERS (IF YOU TAKE THEM). 5. Please wear comfortable clothing and shoes. Do not wear anything containing      Metal over the chest area (chains, necklaces, medals, safety pins, or a metal       underwire bra. All other types of bras are perfectly acceptable. _----------------------------------    Scheduling US Thyroid / PFT 03/31/22 @ [facility]    Arrive @1:15 to 1st floor radiology ( U.S thyroid )     PFT ( Breathing test )  to follow thyroid  [address] [place] [address] heart center . 1. Light meal prior  2. No breathing meds 4-6 hr prior to test  3. Hold antihistamine 48 hr prior  4. No caffeine, smoking, alcohol day of test  5.  No exercising day of test.  6. Cover mouth to prevent breathing cold air day of test.      To reschedule  Call [phone] option #1

## 2022-03-16 NOTE — PROGRESS NOTES
[facility] Medicine at / [address] [facility] [address]  Chief Complaint   Patient presents with    Other     pt states that he gets chest pressure and sometimes will get short of breath with the chest pressure. History obtained from chart review and the patient. SUBJECTIVE:  Linda Segura is a 19 y.o. male that presents today for chest discomfort      Chest Pain    Location -  mid chest  Symptoms have been present for 1.5 year(s). Onset of symptoms was gradual.  Inciting Event? No       Description of chest pain -heaviness, pressure. The pain does not radiate. Intensity - moderate. Aggravating factors - eating makes it worse, also notes it worsens when he fixates on it  Alleviating factors -  Eating more greens. nuts does seem to help. N/V? No  SOB? He does have some SOB  Lightheadedness? No  Palpitations? No  Diaphoresis? No  Cough? No    Cardiac risk factors (ex. smoking/tobacco exposure, family history, diabetes mellitus, hypertension, dyslipidemia, low HDL, metabolic syndrome, kidney disease, peripheral vascular disease, AAA, obesity or sedentary lifestyle)? N/a    A friend told him that they developed inflammation in the heart after COVID. He did some research and found some dietary changes which can help with this. He has made some dietary changes which seem to help with the symptoms.     Age/Gender Health Maintenance    Lipid - 40  DM Screen - 40  Colon Cancer Screening - 48  Lung Cancer Screening (Age 54 to [de-identified] with 30 pack year hx, current smoker or quit within past 15 years) - n/a    Tetanus - needs  Influenza Vaccine - yearly  Pneumonia Vaccine - 63  Zostavax - 50   HPV Vaccine - n/a    PSA testing discussion - 53  AAA Screening - n/a    Falls screening - n/a    Current Outpatient Medications   Medication Sig Dispense Refill    albuterol sulfate HFA (VENTOLIN HFA) 108 (90 Base) MCG/ACT inhaler Inhale 2 puffs into the lungs every 4 hours as needed for Wheezing or Shortness of Breath 1 each 1     No current facility-administered medications for this visit. Orders Placed This Encounter   Medications    albuterol sulfate HFA (VENTOLIN HFA) 108 (90 Base) MCG/ACT inhaler     Sig: Inhale 2 puffs into the lungs every 4 hours as needed for Wheezing or Shortness of Breath     Dispense:  1 each     Refill:  1         All medications reviewed and reconciled, including OTC and herbal medications. Updated list given to patient. Patient Active Problem List   Diagnosis    Abscess of parotid gland    Acute suppurative parotitis    Trismus    Parotid sialolithiasis       Past Medical History:   Diagnosis Date    Abscess of parotid gland        History reviewed. No pertinent surgical history. No Known Allergies    Social History     Socioeconomic History    Marital status: Single     Spouse name: Not on file    Number of children: Not on file    Years of education: Not on file    Highest education level: Not on file   Occupational History    Not on file   Tobacco Use    Smoking status: Never Smoker    Smokeless tobacco: Never Used   Vaping Use    Vaping Use: Never used   Substance and Sexual Activity    Alcohol use: Yes     Alcohol/week: 5.0 - 15.0 standard drinks     Types: 5 - 15 Cans of beer per week     Comment: on the weekends    Drug use: Never    Sexual activity: Yes     Partners: Female   Other Topics Concern    Not on file   Social History Narrative    Not on file     Social Determinants of Health     Financial Resource Strain:     Difficulty of Paying Living Expenses: Not on file   Food Insecurity:     Worried About Running Out of Food in the Last Year: Not on file    Ramez of Food in the Last Year: Not on file   Transportation Needs:     Lack of Transportation (Medical): Not on file    Lack of Transportation (Non-Medical):  Not on file   Physical Activity:     Days of Exercise per Week: Not on file    Minutes of Exercise per Session: Not on file   Stress:     Feeling of Stress : Not on file   Social Connections:     Frequency of Communication with Friends and Family: Not on file    Frequency of Social Gatherings with Friends and Family: Not on file    Attends Mandaeism Services: Not on file    Active Member of Clubs or Organizations: Not on file    Attends Club or Organization Meetings: Not on file    Marital Status: Not on file   Intimate Partner Violence:     Fear of Current or Ex-Partner: Not on file    Emotionally Abused: Not on file    Physically Abused: Not on file    Sexually Abused: Not on file   Housing Stability:     Unable to Pay for Housing in the Last Year: Not on file    Number of [place] in the Last Year: Not on file    Unstable Housing in the Last Year: Not on file       History reviewed. No pertinent family history. I have reviewed the patient's past medical history, past surgical history, allergies, medications, social and family history and I have made updates where appropriate.       Review of Systems  Positive responses are highlighted in bold    Constitutional:  Fever, Chills, Night Sweats, Fatigue, Unexpected changes in weight  Eyes:  Eye discharge, Eye pain, Eye redness, Visual disturbances   HENT:  Ear pain, Tinnitus, Nosebleeds, Trouble swallowing, Hearing loss, Sore throat  Cardiovascular:  Chest Pain, Palpitations, Orthopnea, Paroxysmal Nocturnal Dyspnea  Respiratory:  Cough, Wheezing, Shortness of breath, Chest tightness, Apnea  Gastrointestinal:  Nausea, Vomiting, Diarrhea, Constipation, Heartburn, Blood in stool  Genitourinary:  Difficulty or painful urination, Flank pain, Change in frequency, Urgency  Skin:  Color change, Rash, Itching, Wound  Psychiatric:  Hallucinations, Anxiety, Depression, Suicidal ideation  Hematological:  Enlarged glands, Easy bleeding, Easily bruising  Musculoskeletal:  Joint pain, Back pain, Gait problems, Joint swelling, Myalgias  Neurological:  Dizziness, Headaches, Presyncope, Numbness, Seizures, Tremors  Allergy:  Environmental allergies, Food allergies  Endocrine:  Heat Intolerance, Cold Intolerance, Polydipsia, Polyphagia, Polyuria    Chest xray 2/28/22  Findings:   Hilar and mediastinal contours are within normal limits. Cardiac silhouette is not enlarged. No pneumothorax. No pleural fluid collection. No pneumonia. Lab Results   Component Value Date     02/28/2022    K 4.2 02/28/2022     02/28/2022    CO2 27 02/28/2022    BUN 14 02/28/2022    CREATININE 0.9 02/28/2022    GLUCOSE 93 02/28/2022    CALCIUM 9.6 02/28/2022    PROT 7.2 02/28/2022    LABALBU 4.8 02/28/2022    BILITOT 0.4 02/28/2022    ALKPHOS 105 02/28/2022    AST 29 02/28/2022    ALT 41 02/28/2022    LABGLOM >90 02/28/2022       Lab Results   Component Value Date    WBC 8.0 02/28/2022    HGB 15.9 02/28/2022    HCT 47.0 02/28/2022    MCV 86.1 02/28/2022     02/28/2022     Lab Results   Component Value Date    TSH 1.920 02/28/2022         PHYSICAL EXAM:  Vitals:    03/16/22 1044   BP: 134/70   Pulse: 71   Resp: 14   Temp: 99.1 °F (37.3 °C)   TempSrc: Oral   SpO2: 99%   Weight: 293 lb (132.9 kg)   Height: 6' 4\" (1.93 m)     Body mass index is 35.67 kg/m². VS Reviewed  General Appearance: A&O x 3, No acute distress,well developed and well- nourished  Head: normocephalic and atraumatic  Eyes: pupils equal, round, and reactive to light, extraocular eye movements intact, conjunctivae and eye lids without erythema  Neck: supple and non-tender, mild swelling and erythema right parotid gland, + thyromegaly but no thyroid nodules, no cervical lymphadenopathy  Pulmonary/Chest: clear to auscultation bilaterally- no wheezes, rales or rhonchi, normal air movement, no respiratory distress or retractions  Cardiovascular: S1 and S2 auscultated w/ RRR. No murmurs, rubs, clicks, or gallops, distal pulses intact.   Abdomen: soft, non-tender, non-distended, bowl sounds physiologic,  no rebound or guarding, no masses or hernias noted. Liver and spleen without enlargement. Extremities: no cyanosis, clubbing or edema of the lower extremities  Musculoskeletal: No joint swelling or gross deformity   Neuro:  Alert, 5/5 strength globally and symmetrically  Psych: Affect appropriate. Mood normal. Thought process is normal without evidence of depression or psychosis. Good insight and appropriate interaction. Cognition and memory appear to be intact. Skin: warm and dry, no rash or erythema  Lymph:  No cervical, auricular or supraclavicular lymph nodes palpated    ASSESSMENT & PLAN  Nisha Chau was seen today for other. Diagnoses and all orders for this visit:    Chest pressure  -     Full PFT Study With Bronchodilator; Future  -     Echocardiogram complete; Future  -     CARDIAC STRESS TEST EXERCISE ONLY; Future    Shortness of breath  -     Full PFT Study With Bronchodilator; Future  -     albuterol sulfate HFA (VENTOLIN HFA) 108 (90 Base) MCG/ACT inhaler; Inhale 2 puffs into the lungs every 4 hours as needed for Wheezing or Shortness of Breath  -     Echocardiogram complete; Future  -     CARDIAC STRESS TEST EXERCISE ONLY; Future    Thyromegaly  -     US THYROID; Future      - reviewed EKG, chest xray and labs  - needs both cardiac and pulm workup  - obtain stress test and echocardiogram  - obtain PFT  - trial albuterol  - has some mild thyromegaly noted on clinical exam, will also obtain thyroid US    DISPOSITION    Return in about 6 weeks (around 4/27/2022) for chest pain. Nisha Chau released without restrictions. PATIENT COUNSELING    Counseling was provided today regarding the following topics: Healthy eating habits, Regular exercise, substance abuse and healthy sleep habits.     Nisha Chau received counseling on the following healthy behaviors: medication adherence    Patient given educational materials on: See Attached    I have instructed Nisha Chau to complete a self tracking handout on none and instructed them to bring it with them to his next appointment. Barriers to learning and self management: none    Discussed use, benefit, and side effects of prescribed medications. Barriers to medication compliance addressed. All patient questions answered. Pt voiced understanding.        Electronically signed by HERI Rock CNP on 3/16/2022 at 10:58 AM

## 2022-03-25 ENCOUNTER — HOSPITAL ENCOUNTER (OUTPATIENT)
Dept: NON INVASIVE DIAGNOSTICS | Age: 22
Discharge: HOME | End: 2022-03-25

## 2022-03-25 VITALS — WEIGHT: 290 LBS | HEIGHT: 77 IN | BODY MASS INDEX: 34.24 KG/M2

## 2022-03-25 DIAGNOSIS — R06.02 SHORTNESS OF BREATH: ICD-10-CM

## 2022-03-25 DIAGNOSIS — R07.89 CHEST PRESSURE: ICD-10-CM

## 2022-03-25 LAB
LV EF: 60 %
LVEF MODALITY: NORMAL

## 2022-03-25 PROCEDURE — 93306 TTE W/DOPPLER COMPLETE: CPT

## 2022-03-25 PROCEDURE — 93017 CV STRESS TEST TRACING ONLY: CPT | Performed by: NUCLEAR MEDICINE

## 2022-03-28 ENCOUNTER — TELEPHONE (OUTPATIENT)
Dept: FAMILY MEDICINE CLINIC | Age: 22
End: 2022-03-28

## 2022-03-28 NOTE — TELEPHONE ENCOUNTER
----- Message from HERI Horowitz - CNP sent at 3/28/2022  7:20 AM EDT -----  Let Hebert Fabry know his stress test and echocardiogram are both normal.

## 2022-03-31 ENCOUNTER — HOSPITAL ENCOUNTER (OUTPATIENT)
Dept: PULMONOLOGY | Age: 22
Discharge: HOME | End: 2022-03-31

## 2022-03-31 ENCOUNTER — TELEPHONE (OUTPATIENT)
Dept: FAMILY MEDICINE CLINIC | Age: 22
End: 2022-03-31

## 2022-03-31 ENCOUNTER — HOSPITAL ENCOUNTER (OUTPATIENT)
Dept: ULTRASOUND IMAGING | Age: 22
Discharge: HOME | End: 2022-03-31

## 2022-03-31 DIAGNOSIS — R07.89 CHEST PRESSURE: ICD-10-CM

## 2022-03-31 DIAGNOSIS — R06.02 SHORTNESS OF BREATH: ICD-10-CM

## 2022-03-31 DIAGNOSIS — E01.0 THYROMEGALY: ICD-10-CM

## 2022-03-31 PROCEDURE — 94726 PLETHYSMOGRAPHY LUNG VOLUMES: CPT

## 2022-03-31 PROCEDURE — 94729 DIFFUSING CAPACITY: CPT

## 2022-03-31 PROCEDURE — 94060 EVALUATION OF WHEEZING: CPT

## 2022-03-31 PROCEDURE — 76536 US EXAM OF HEAD AND NECK: CPT

## 2022-03-31 NOTE — TELEPHONE ENCOUNTER
----- Message from Cindy Ayala, DO sent at 3/31/2022  3:25 PM EDT -----  Please let pt know that thyroid is slightly enlarged but otherwise looks good. Recent thyroid fxn also WNL  Based on this, no f/u imaging required. Will be discussed further at f/u with Ava Valdes on 4/27  Let me know if questions, thanks!

## 2022-04-04 ENCOUNTER — TELEPHONE (OUTPATIENT)
Dept: FAMILY MEDICINE CLINIC | Age: 22
End: 2022-04-04

## 2022-04-04 NOTE — TELEPHONE ENCOUNTER
----- Message from Catarino Poole DO sent at 4/4/2022  7:01 AM EDT -----  Please let pt know that PFTs are WNL  Let me know if questions, thanks!